# Patient Record
Sex: FEMALE | Race: BLACK OR AFRICAN AMERICAN | URBAN - METROPOLITAN AREA
[De-identification: names, ages, dates, MRNs, and addresses within clinical notes are randomized per-mention and may not be internally consistent; named-entity substitution may affect disease eponyms.]

---

## 2020-08-25 ENCOUNTER — PATIENT MESSAGE (OUTPATIENT)
Dept: PRIMARY CARE CLINIC | Age: 45
End: 2020-08-25

## 2020-08-26 ENCOUNTER — TELEPHONE (OUTPATIENT)
Dept: PRIMARY CARE CLINIC | Age: 45
End: 2020-08-26

## 2020-08-26 NOTE — TELEPHONE ENCOUNTER
----- Message from [facility]. Shahnaz sent at 8/26/2020  9:55 AM EDT -----  Regarding: RE: Non-Urgent Medical Question  Contact: [phone]  Thank you    ----- Message -----  From: Jerry Scott CMA  Sent: 8/26/20 8:54 AM  To: [facility] Shahnaz  Subject: RE: Non-Urgent Medical Question    Good Morning Martin Feast,   I will foreword this message to [facility] nurse. She is on maternity leave. 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 In addition several staff members have as well. I'm very concerned because my immune system tends to be low and plus I'm a diabetic. Currently the only way for me to stay home is with doctors permission. I would like your expertise as to what I should do.

## 2020-08-26 NOTE — TELEPHONE ENCOUNTER
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

## 2020-09-02 ENCOUNTER — TELEPHONE (OUTPATIENT)
Dept: PRIMARY CARE CLINIC | Age: 45
End: 2020-09-02

## 2020-09-02 ENCOUNTER — VIRTUAL VISIT (OUTPATIENT)
Dept: PRIMARY CARE CLINIC | Age: 45
End: 2020-09-02
Payer: COMMERCIAL

## 2020-09-02 DIAGNOSIS — E11.65 UNCONTROLLED TYPE 2 DIABETES MELLITUS WITH HYPERGLYCEMIA (HCC): Chronic | ICD-10-CM

## 2020-09-02 DIAGNOSIS — I10 ESSENTIAL HYPERTENSION: Primary | ICD-10-CM

## 2020-09-02 DIAGNOSIS — E66.9 OBESITY, CLASS II, BMI 35-39.9: ICD-10-CM

## 2020-09-02 DIAGNOSIS — T73.0XXA HUNGRY, INITIAL ENCOUNTER: Chronic | ICD-10-CM

## 2020-09-02 PROCEDURE — 99214 OFFICE O/P EST MOD 30 MIN: CPT | Performed by: FAMILY MEDICINE

## 2020-09-02 RX ORDER — FAMOTIDINE 20 MG/1
20 TABLET, FILM COATED ORAL 2 TIMES DAILY
Qty: 60 TAB | Refills: 1 | Status: SHIPPED | OUTPATIENT
Start: 2020-09-02 | End: 2021-03-17 | Stop reason: ALTCHOICE

## 2020-09-02 RX ORDER — NEBIVOLOL 20 MG/1
20 TABLET ORAL DAILY
Qty: 90 TAB | Refills: 1 | Status: SHIPPED | OUTPATIENT
Start: 2020-09-02 | End: 2021-01-03 | Stop reason: SDUPTHER

## 2020-09-02 RX ORDER — METFORMIN HYDROCHLORIDE 500 MG/1
1000 TABLET, EXTENDED RELEASE ORAL DAILY
COMMUNITY
End: 2020-09-02 | Stop reason: SDUPTHER

## 2020-09-02 RX ORDER — NEBIVOLOL 20 MG/1
20 TABLET ORAL DAILY
COMMUNITY
End: 2020-09-02 | Stop reason: SDUPTHER

## 2020-09-02 RX ORDER — LINAGLIPTIN 5 MG/1
5 TABLET, FILM COATED ORAL DAILY
COMMUNITY
End: 2020-11-12

## 2020-09-02 RX ORDER — METFORMIN HYDROCHLORIDE 500 MG/1
1000 TABLET, EXTENDED RELEASE ORAL DAILY
Qty: 60 TAB | Refills: 1 | Status: SHIPPED | OUTPATIENT
Start: 2020-09-02 | End: 2020-09-21 | Stop reason: SDUPTHER

## 2020-09-02 NOTE — TELEPHONE ENCOUNTER
Dr. Shawn Lewis told me to call patient and sent up a nurse visit for patient to teach her how to use her glucometer.  Called patient at 10:36 9/2/2020 left a message; no answer

## 2020-09-02 NOTE — PROGRESS NOTES
HISTORY OF PRESENT ILLNESS  Diabetes:  DM follow up appt  FBS not checking bc she does not know how to use her machine. Last HbA1c was 8.0 in March 2020   PT is taking metformin 500 XR  BID and tradjenta 5 in PM>    never been to DM education    Hypertension:  PT taking bystolic 20 mg qd    Checks BP at home /82. Recurrent abdominal pain:  Upper abdomen. Not worse with fatty foods. Still has gallbladder  Hungry all the time it seems. every 2 hrs or so, occ nausea. pain in stomach with diarrhea  In the evening with metftomin and tradjenta dosing. Takes metformin alone in AM and it does not bother her.     glipizide had also had symptoms with it. No waterbrash. No hx of gastritis. No hx of gastroparesis. No family his of Pancreatic  Cancer or panceratitis but says her MGM and her Mom have bad GERD>      Jeni Baltazar is a 39 y.o. female. Past Medical History:   Diagnosis Date    Anemia     Anxiety     Hypertension     Polycystic ovaries      Social History     Tobacco Use    Smoking status: Never Smoker    Smokeless tobacco: Never Used   Substance Use Topics    Alcohol use: Not on file    Drug use: Not on file       Family History   Problem Relation Age of Onset    Diabetes Other     Elevated Lipids Other     Heart Attack Other     Hypertension Other     Thyroid Disease Other        Review of Systems   Constitutional: Negative. Negative for chills, diaphoresis, fever and malaise/fatigue. Eyes: Negative for blurred vision. Respiratory: Negative for cough. Cardiovascular: Negative for chest pain, palpitations and orthopnea. Gastrointestinal: Positive for abdominal pain, diarrhea and nausea. Negative for constipation, heartburn and vomiting. Musculoskeletal: Negative for myalgias. Neurological: Negative for dizziness, weakness and headaches. Physical Exam  Constitutional:       General: She is not in acute distress.      Appearance: Normal appearance. She is obese. HENT:      Head: Normocephalic and atraumatic. Eyes:      Extraocular Movements: Extraocular movements intact. Conjunctiva/sclera: Conjunctivae normal.   Pulmonary:      Effort: Pulmonary effort is normal. No respiratory distress. Abdominal:      Palpations: Abdomen is soft. Neurological:      General: No focal deficit present. Mental Status: She is alert and oriented to person, place, and time. Psychiatric:         Mood and Affect: Mood normal.         Behavior: Behavior normal.         Thought Content: Thought content normal.         Judgment: Judgment normal.           ASSESSMENT and PLAN  Diagnoses and all orders for this visit:    1. Essential hypertension  Comments:  at goal.   Orders:  -     METABOLIC PANEL, COMPREHENSIVE  -     LIPID PANEL  -     CBC WITH AUTOMATED DIFF    2. Uncontrolled type 2 diabetes mellitus with hyperglycemia (HCC)  Comments:   Chart FBS daily. Come in for nurse educ of machine. take metformin qd X 3 days, then BID X 3 days , then add trdjenta X 3 days. call with #s& symtoms. Orders:  -     METABOLIC PANEL, COMPREHENSIVE  -     LIPID PANEL  -     HEMOGLOBIN A1C WITH EAG  -     AMYLASE  -     LIPASE  -     REFERRAL TO DIABETIC EDUCATION; Standing    3. Obesity, Class II, BMI 35-39.9  -     HEMOGLOBIN A1C WITH EAG  -     CBC WITH AUTOMATED DIFF  -     AMYLASE  -     LIPASE  -     REFERRAL TO DIABETIC EDUCATION; Standing    4. Hungry, initial encounter  Comments:  sounds like heartburn. add pepcid. If pain worses on pepcid change to prilosec. if heartburn gets worse  - continue - its working  and you are now more aware  Orders:  -     METABOLIC PANEL, COMPREHENSIVE  -     CBC WITH AUTOMATED DIFF  -     AMYLASE  -     LIPASE    Other orders  -     metFORMIN ER (GLUCOPHAGE XR) 500 mg tablet; Take 2 Tabs by mouth daily. Indications: type 2 diabetes mellitus  -     nebivoloL (Bystolic) 20 mg tablet; Take 1 Tab by mouth daily.  Indications: high blood pressure  -     famotidine (PEPCID) 20 mg tablet; Take 1 Tab by mouth two (2) times a day. Indications: heartburn         Orders Placed This Encounter    METABOLIC PANEL, COMPREHENSIVE    LIPID PANEL    HEMOGLOBIN A1C WITH EAG    CBC WITH AUTOMATED DIFF    AMYLASE    LIPASE    REFERRAL TO DIABETES EDUCATION - [facility]    DISCONTD: nebivoloL (Bystolic) 20 mg tablet    DISCONTD: metFORMIN ER (GLUCOPHAGE XR) 500 mg tablet    linaGLIPtin (Tradjenta) 5 mg tablet    metFORMIN ER (GLUCOPHAGE XR) 500 mg tablet    nebivoloL (Bystolic) 20 mg tablet    famotidine (PEPCID) 20 mg tablet     Follow-up and Dispositions    · Return in about 4 weeks (around 9/30/2020) for Medciation change follow up, phone follow up in 10 days.

## 2020-09-12 LAB
ALBUMIN SERPL-MCNC: 4.4 G/DL (ref 3.8–4.8)
ALBUMIN/GLOB SERPL: 1.5 {RATIO} (ref 1.2–2.2)
ALP SERPL-CCNC: 91 IU/L (ref 39–117)
ALT SERPL-CCNC: 14 IU/L (ref 0–32)
AMYLASE SERPL-CCNC: 63 U/L (ref 31–110)
AST SERPL-CCNC: 12 IU/L (ref 0–40)
BASOPHILS # BLD AUTO: 0 X10E3/UL (ref 0–0.2)
BASOPHILS NFR BLD AUTO: 0 %
BILIRUB SERPL-MCNC: 0.4 MG/DL (ref 0–1.2)
BUN SERPL-MCNC: 13 MG/DL (ref 6–24)
BUN/CREAT SERPL: 20 (ref 9–23)
CALCIUM SERPL-MCNC: 10.1 MG/DL (ref 8.7–10.2)
CHLORIDE SERPL-SCNC: 101 MMOL/L (ref 96–106)
CHOLEST SERPL-MCNC: 205 MG/DL (ref 100–199)
CO2 SERPL-SCNC: 26 MMOL/L (ref 20–29)
CREAT SERPL-MCNC: 0.66 MG/DL (ref 0.57–1)
EOSINOPHIL # BLD AUTO: 0.1 X10E3/UL (ref 0–0.4)
EOSINOPHIL NFR BLD AUTO: 1 %
ERYTHROCYTE [DISTWIDTH] IN BLOOD BY AUTOMATED COUNT: 11.8 % (ref 11.7–15.4)
EST. AVERAGE GLUCOSE BLD GHB EST-MCNC: 163 MG/DL
GLOBULIN SER CALC-MCNC: 2.9 G/DL (ref 1.5–4.5)
GLUCOSE SERPL-MCNC: 145 MG/DL (ref 65–99)
HBA1C MFR BLD: 7.3 % (ref 4.8–5.6)
HCT VFR BLD AUTO: 38.2 % (ref 34–46.6)
HDLC SERPL-MCNC: 37 MG/DL
HGB BLD-MCNC: 12.5 G/DL (ref 11.1–15.9)
IMM GRANULOCYTES # BLD AUTO: 0 X10E3/UL (ref 0–0.1)
IMM GRANULOCYTES NFR BLD AUTO: 0 %
LDLC SERPL CALC-MCNC: 148 MG/DL (ref 0–99)
LIPASE SERPL-CCNC: 48 U/L (ref 14–72)
LYMPHOCYTES # BLD AUTO: 3.9 X10E3/UL (ref 0.7–3.1)
LYMPHOCYTES NFR BLD AUTO: 47 %
MCH RBC QN AUTO: 30.3 PG (ref 26.6–33)
MCHC RBC AUTO-ENTMCNC: 32.7 G/DL (ref 31.5–35.7)
MCV RBC AUTO: 93 FL (ref 79–97)
MONOCYTES # BLD AUTO: 0.5 X10E3/UL (ref 0.1–0.9)
MONOCYTES NFR BLD AUTO: 6 %
NEUTROPHILS # BLD AUTO: 3.8 X10E3/UL (ref 1.4–7)
NEUTROPHILS NFR BLD AUTO: 46 %
PLATELET # BLD AUTO: 263 X10E3/UL (ref 150–450)
POTASSIUM SERPL-SCNC: 4.1 MMOL/L (ref 3.5–5.2)
PROT SERPL-MCNC: 7.3 G/DL (ref 6–8.5)
RBC # BLD AUTO: 4.13 X10E6/UL (ref 3.77–5.28)
SODIUM SERPL-SCNC: 141 MMOL/L (ref 134–144)
TRIGL SERPL-MCNC: 111 MG/DL (ref 0–149)
VLDLC SERPL CALC-MCNC: 20 MG/DL (ref 5–40)
WBC # BLD AUTO: 8.3 X10E3/UL (ref 3.4–10.8)

## 2020-09-18 NOTE — PROGRESS NOTES
Normal  blood count, normal liver and kidney function and normal blood sugar. A1c 7.3 goal under 7. LDL  148 goal under 70.    I would like to start a low dose chol pill eg crestor 5 mg

## 2020-09-21 ENCOUNTER — TELEPHONE (OUTPATIENT)
Dept: PRIMARY CARE CLINIC | Age: 45
End: 2020-09-21

## 2020-09-21 DIAGNOSIS — E11.9 TYPE 2 DIABETES MELLITUS WITHOUT COMPLICATION, UNSPECIFIED WHETHER LONG TERM INSULIN USE (HCC): Primary | ICD-10-CM

## 2020-09-21 NOTE — TELEPHONE ENCOUNTER
I called pt and LVM for her about her labs and ask will she willing to take some cholesterol meds.  SEBLE

## 2020-09-22 ENCOUNTER — TELEPHONE (OUTPATIENT)
Dept: PRIMARY CARE CLINIC | Age: 45
End: 2020-09-22

## 2020-09-22 DIAGNOSIS — E78.5 HYPERLIPIDEMIA, UNSPECIFIED HYPERLIPIDEMIA TYPE: Primary | ICD-10-CM

## 2020-09-22 RX ORDER — METFORMIN HYDROCHLORIDE 500 MG/1
1000 TABLET, EXTENDED RELEASE ORAL 2 TIMES DAILY
Qty: 120 TAB | Refills: 1 | Status: SHIPPED | OUTPATIENT
Start: 2020-09-22 | End: 2021-01-06 | Stop reason: SDUPTHER

## 2020-09-22 RX ORDER — ROSUVASTATIN CALCIUM 5 MG/1
5 TABLET, COATED ORAL
Qty: 30 TAB | Refills: 0 | Status: SHIPPED | OUTPATIENT
Start: 2020-09-22 | End: 2020-10-25

## 2020-09-22 NOTE — TELEPHONE ENCOUNTER
*-*-*This message has not been handled. *-*-*    Thanks! Is it possible that the Metformin can be increased since that isn't bothering me? Originally Diane Zaman wanted me on 1000 mg 2x a day. That was when I was first diagnosed in Dec 2019. She was starting me on a lower dose in the beginning.

## 2020-09-22 NOTE — TELEPHONE ENCOUNTER
I'm not able to take the tradjenta. It does cause my stomach to hurt. Is there something else I can take with the metformin?     I did receive your message about crestor for my cholesterol.

## 2020-09-22 NOTE — TELEPHONE ENCOUNTER
Pt called and said your message back to her didn't answer her question and she said for you to call her [phone]

## 2020-09-22 NOTE — TELEPHONE ENCOUNTER
Patient called today and stated that she has sent 3 different messages and she has followed the instructions that she was given by dr. Ming Castellon to take the metformin and tradjenta 5 mj at different times and the tradjenta is what is making her stomach hurt and she was supposed to have crestor called in for her as well & that has not been done as of yet either.

## 2020-09-22 NOTE — TELEPHONE ENCOUNTER
I have sent several messages to her that it is over 130. During my virtual appointment I told her I'm not able to mix metformin and tradjenta. My previous doctor (Kemi Lama) had me doing that and it made me very sick.

## 2020-09-22 NOTE — TELEPHONE ENCOUNTER
Dr. Sloan Yap, states to take half of the tradjenta with metformin. If you stomach continues to hurt, stop the tradjenta. Check your BS in the morning and if it continuously over 130, let us know. These mychart messages don't go to a particular staff bucket, sorry for the delay response. Make sure you set up you follow up appt for next month.  Crestor sent

## 2020-10-23 DIAGNOSIS — E78.5 HYPERLIPIDEMIA, UNSPECIFIED HYPERLIPIDEMIA TYPE: ICD-10-CM

## 2020-10-25 RX ORDER — ROSUVASTATIN CALCIUM 5 MG/1
TABLET, COATED ORAL
Qty: 30 TAB | Refills: 0 | Status: SHIPPED | OUTPATIENT
Start: 2020-10-25 | End: 2020-11-12 | Stop reason: SDUPTHER

## 2020-10-26 ENCOUNTER — TELEPHONE (OUTPATIENT)
Dept: PRIMARY CARE CLINIC | Age: 45
End: 2020-10-26

## 2020-10-26 RX ORDER — ROSUVASTATIN CALCIUM 5 MG/1
5 TABLET, COATED ORAL
Qty: 30 TAB | Refills: 0 | Status: SHIPPED | OUTPATIENT
Start: 2020-10-26 | End: 2020-11-12

## 2020-10-26 NOTE — TELEPHONE ENCOUNTER
Called the # in the system and a man said it was the wrong # but the patient already has an appt for the 28 th with jonathan.

## 2020-11-04 NOTE — TELEPHONE ENCOUNTER
BRANDI Manzano Pt did not show on 10/5/20 for appt for diabetes ed and has not responded to calls to rescedule    She has appt with you tommorrow

## 2020-11-12 ENCOUNTER — VIRTUAL VISIT (OUTPATIENT)
Dept: PRIMARY CARE CLINIC | Age: 45
End: 2020-11-12
Payer: COMMERCIAL

## 2020-11-12 DIAGNOSIS — I10 ESSENTIAL HYPERTENSION: Primary | ICD-10-CM

## 2020-11-12 DIAGNOSIS — R19.7 DIARRHEA, UNSPECIFIED TYPE: ICD-10-CM

## 2020-11-12 DIAGNOSIS — E78.5 HYPERLIPIDEMIA, UNSPECIFIED HYPERLIPIDEMIA TYPE: ICD-10-CM

## 2020-11-12 DIAGNOSIS — E11.65 UNCONTROLLED TYPE 2 DIABETES MELLITUS WITH HYPERGLYCEMIA (HCC): ICD-10-CM

## 2020-11-12 PROBLEM — E78.2 MIXED HYPERLIPIDEMIA: Status: ACTIVE | Noted: 2020-11-12

## 2020-11-12 PROCEDURE — 99214 OFFICE O/P EST MOD 30 MIN: CPT | Performed by: NURSE PRACTITIONER

## 2020-11-12 RX ORDER — ROSUVASTATIN CALCIUM 5 MG/1
TABLET, COATED ORAL
Qty: 90 TAB | Refills: 0 | Status: SHIPPED | OUTPATIENT
Start: 2020-11-12 | End: 2021-03-17 | Stop reason: SDUPTHER

## 2020-11-12 NOTE — LETTER
NOTIFICATION RETURN TO WORK / SCHOOL 
 
11/12/2020 10:59 AM 
 
Ms. Vasquez Christie [facility] [address] [facility] [id number] To Whom It May Concern: 
 
Justin Brooks is currently under the care of Jovana Dallas. Please excuse Ms. Christie from work starting on 11/10/2020. She will return to work/school on: when covid test results in next 1-5 days as long as the covid test is negative. If there are questions or concerns please have the patient contact our office. Sincerely, Sudhir Desai NP

## 2020-11-12 NOTE — ASSESSMENT & PLAN NOTE
GI issues with DPP4 and sulfonylureas class of medication. Start on farxiga   Continue on metformin 1500 mg a day. Return in 3 months for a1c check. Start monitoring fasting BG at home. Key Antihyperglycemic Medications             dapagliflozin (Farxiga) 5 mg tab tablet (Taking) Take 1 Tab by mouth daily. metFORMIN ER (GLUCOPHAGE XR) 500 mg tablet (Taking) Take 2 Tabs by mouth two (2) times a day.  Indications: type 2 diabetes mellitus        Other Key Diabetic Medications             rosuvastatin (CRESTOR) 5 mg tablet (Taking) Take 1 tablet by mouth nightly        Lab Results   Component Value Date/Time    Hemoglobin A1c 7.3 09/11/2020 02:29 PM    Glucose 145 09/11/2020 02:29 PM    Creatinine 0.66 09/11/2020 02:29 PM    Cholesterol, total 205 09/11/2020 02:29 PM    HDL Cholesterol 37 09/11/2020 02:29 PM    LDL Chol Calc (NIH) 148 09/11/2020 02:29 PM    Triglyceride 111 09/11/2020 02:29 PM     Diabetic Foot and Eye Exam HM Status   Topic Date Due    Diabetic Foot Care  06/29/1987    Eye Exam  06/29/1987

## 2020-11-12 NOTE — PROGRESS NOTES
HISTORY OF PRESENT ILLNESS  Leslie Christie is a 39 y.o. female presents via telemedicine for diabetes follow up. 1. Diabetes:  Patients last a1c was 7.3% in September. Patient reported that she was having diarrhea with tradjenta and this was stopped by DR. LEAL in September. Patient is only able to tolerate 3 metformin tablets a day so she takes 1 in the am and 2 in the PM.    Patient reported she hasn't been checking her BG at home recently. When she was checking it in September on just the metformin, her BG was 160's in the AM fasting. Also had stomach issues with glipizide. 2. Mixed hyperlipidemia:  Patient recently started on crestor for her elevated cholesterol. Denies any complications from the crestor. 3. Hypertension:  Patients BP is well controlled on bystolic. Patient denies headahces, dizziness or blurred vision. Patient had fever with diarrhea over the past few days. Got a covid test yesterday and is waiting for results. Not able to return to work until covid test comes back. Needs note for work. There were no vitals filed for this visit. Patient Active Problem List   Diagnosis Code    Anemia D64.9    Anxiety F41.9    Hypertension I10    Polycystic ovaries E28.2    Obesity, Class II, BMI 35-39.9 E66.9    Uncontrolled type 2 diabetes mellitus with hyperglycemia ([facility]) E11.65    Feeding [address]. 0XXA    Hyperlipidemia E78.5     Patient Active Problem List    Diagnosis Date Noted    Hyperlipidemia 11/12/2020    Obesity, Class II, BMI 35-39.9 09/02/2020    Uncontrolled type 2 diabetes mellitus with hyperglycemia ([facility] [address] 09/02/2020    Feeding [address] 53/75/6681    Anemia     Anxiety     Hypertension     Polycystic ovaries      Current Outpatient Medications   Medication Sig Dispense Refill    dapagliflozin (Farxiga) 5 mg tab tablet Take 1 Tab by mouth daily.  90 Tab 0    rosuvastatin (CRESTOR) 5 mg tablet Take 1 tablet by mouth nightly 90 Tab 0    metFORMIN ER (GLUCOPHAGE XR) 500 mg tablet Take 2 Tabs by mouth two (2) times a day. Indications: type 2 diabetes mellitus 120 Tab 1    nebivoloL (Bystolic) 20 mg tablet Take 1 Tab by mouth daily. Indications: high blood pressure 90 Tab 1    famotidine (PEPCID) 20 mg tablet Take 1 Tab by mouth two (2) times a day. Indications: heartburn 60 Tab 1     Allergies   Allergen Reactions    Tradjenta [Linagliptin] Other (comments)     Upset stomach      Past Medical History:   Diagnosis Date    Anemia     Anxiety     Diabetes ([address]     Hypertension     Polycystic ovaries      History reviewed. No pertinent surgical history. Family History   Problem Relation Age of Onset    Diabetes Other     Elevated Lipids Other     Heart Attack Other     Thyroid Disease Other     Hypertension Mother     Hypertension Father      Social History     Tobacco Use    Smoking status: Never Smoker    Smokeless tobacco: Never Used   Substance Use Topics    Alcohol use: Never     Frequency: Never           Review of Systems   Constitutional: Negative for chills and fever. Eyes: Negative for blurred vision. Respiratory: Negative for shortness of breath. Cardiovascular: Negative for chest pain and palpitations. Gastrointestinal: Negative for diarrhea, nausea and vomiting. Musculoskeletal: Negative for joint pain and myalgias. Neurological: Negative for dizziness and headaches. Physical Exam  Constitutional:       Appearance: She is obese. HENT:      Head: Normocephalic. Eyes:      Conjunctiva/sclera: Conjunctivae normal.      Pupils: Pupils are equal, round, and reactive to light. Pulmonary:      Effort: Pulmonary effort is normal.   Skin:     General: Skin is warm and dry. Neurological:      Mental Status: She is alert. ASSESSMENT and PLAN  Diagnoses and all orders for this visit:    1. Essential hypertension  Assessment & Plan:  Well controlled on bystolic.        2. Uncontrolled type 2 diabetes mellitus with hyperglycemia ([facility] [address]  Assessment & Plan:  GI issues with DPP4 and sulfonylureas class of medication. Start on farxiga   Continue on metformin 1500 mg a day. Return in 3 months for a1c check. Start monitoring fasting BG at home. Key Antihyperglycemic Medications             dapagliflozin (Farxiga) 5 mg tab tablet (Taking) Take 1 Tab by mouth daily. metFORMIN ER (GLUCOPHAGE XR) 500 mg tablet (Taking) Take 2 Tabs by mouth two (2) times a day. Indications: type 2 diabetes mellitus        Other Key Diabetic Medications             rosuvastatin (CRESTOR) 5 mg tablet (Taking) Take 1 tablet by mouth nightly        Lab Results   Component Value Date/Time    Hemoglobin A1c 7.3 09/11/2020 02:29 PM    Glucose 145 09/11/2020 02:29 PM    Creatinine 0.66 09/11/2020 02:29 PM    Cholesterol, total 205 09/11/2020 02:29 PM    HDL Cholesterol 37 09/11/2020 02:29 PM    LDL Chol Calc (NIH) 148 09/11/2020 02:29 PM    Triglyceride 111 09/11/2020 02:29 PM     Diabetic Foot and Eye Exam HM Status   Topic Date Due    Diabetic Foot Care  06/29/1987    Eye Exam  06/29/1987       Orders:  -     dapagliflozin (Farxiga) 5 mg tab tablet; Take 1 Tab by mouth daily. 3. Diarrhea, unspecified type  Comments:  Resolved at this time, awaiting covid test results. Provided work note for patient today. 4. Hyperlipidemia, unspecified hyperlipidemia type  Assessment & Plan:  Continue on crestor. Recheck lipid panel in 3 months. Orders:  -     rosuvastatin (CRESTOR) 5 mg tablet; Take 1 tablet by mouth nightly       Vasquez Christie, who was evaluated through a synchronous (real-time) audio-video encounter, and/or her healthcare decision maker, is aware that it is a billable service, with coverage as determined by her insurance carrier. She provided verbal consent to proceed: Yes, and patient identification was verified.  It was conducted pursuant to the emergency declaration under the [address] Emergencies Act, [facility] authority and the Coronavirus Preparedness and Response Supplemental Appropriations Act. A caregiver was present when appropriate. Ability to conduct physical exam was limited. I was at home. The patient was at home.         Evita Coko NP

## 2021-01-06 ENCOUNTER — PATIENT MESSAGE (OUTPATIENT)
Dept: PRIMARY CARE CLINIC | Age: 46
End: 2021-01-06

## 2021-01-06 DIAGNOSIS — E11.9 TYPE 2 DIABETES MELLITUS WITHOUT COMPLICATION, UNSPECIFIED WHETHER LONG TERM INSULIN USE (HCC): ICD-10-CM

## 2021-01-06 RX ORDER — METFORMIN HYDROCHLORIDE 500 MG/1
1500 TABLET, EXTENDED RELEASE ORAL
Qty: 270 TAB | Refills: 0 | Status: SHIPPED | OUTPATIENT
Start: 2021-01-06 | End: 2021-03-17 | Stop reason: SDUPTHER

## 2021-01-06 NOTE — TELEPHONE ENCOUNTER
From: Joanie Christie  To: Rigoberto Conley NP  Sent: 1/6/2021 8:22 AM EST  Subject: Prescription Question    Dr. Viola Ballard,  Recently I had Metformin refilled at the pharmacy. It was an old metformin prescription from when I was taking 2 per day. Currently I'm suppose to take 3 per day (prescription written for 4 x per day but Dr. Nisreen Mcdowell allowed me to reduce it since it was making me a little sick). Therefore I'm currently taking 3 per day. The prescription that I just had refilled yesterday isn't going to be enough since I am taking 3 per day. How can this be adjusted? I need enough for 3 per day. I hope you understand what I'm trying to say.

## 2021-02-22 DIAGNOSIS — E11.65 UNCONTROLLED TYPE 2 DIABETES MELLITUS WITH HYPERGLYCEMIA (HCC): ICD-10-CM

## 2021-02-23 RX ORDER — DAPAGLIFLOZIN 5 MG/1
TABLET, FILM COATED ORAL
Qty: 90 TAB | Refills: 0 | Status: SHIPPED | OUTPATIENT
Start: 2021-02-23 | End: 2021-03-17 | Stop reason: SDUPTHER

## 2021-03-17 ENCOUNTER — VIRTUAL VISIT (OUTPATIENT)
Dept: PRIMARY CARE CLINIC | Age: 46
End: 2021-03-17
Payer: COMMERCIAL

## 2021-03-17 DIAGNOSIS — E78.5 HYPERLIPIDEMIA, UNSPECIFIED HYPERLIPIDEMIA TYPE: Chronic | ICD-10-CM

## 2021-03-17 DIAGNOSIS — E55.9 VITAMIN D DEFICIENCY: ICD-10-CM

## 2021-03-17 DIAGNOSIS — I10 ESSENTIAL HYPERTENSION: Primary | ICD-10-CM

## 2021-03-17 DIAGNOSIS — E11.9 TYPE 2 DIABETES MELLITUS WITHOUT COMPLICATION, UNSPECIFIED WHETHER LONG TERM INSULIN USE (HCC): ICD-10-CM

## 2021-03-17 PROCEDURE — 99214 OFFICE O/P EST MOD 30 MIN: CPT | Performed by: NURSE PRACTITIONER

## 2021-03-17 RX ORDER — ROSUVASTATIN CALCIUM 5 MG/1
TABLET, COATED ORAL
Qty: 90 TAB | Refills: 1 | Status: SHIPPED | OUTPATIENT
Start: 2021-03-17 | End: 2021-09-30 | Stop reason: SDUPTHER

## 2021-03-17 RX ORDER — NEBIVOLOL 20 MG/1
20 TABLET ORAL DAILY
Qty: 90 TAB | Refills: 1 | Status: SHIPPED | OUTPATIENT
Start: 2021-03-17 | End: 2021-09-30 | Stop reason: SDUPTHER

## 2021-03-17 RX ORDER — METFORMIN HYDROCHLORIDE 500 MG/1
1500 TABLET, EXTENDED RELEASE ORAL
Qty: 270 TAB | Refills: 1 | Status: SHIPPED | OUTPATIENT
Start: 2021-03-17 | End: 2021-09-30 | Stop reason: SDUPTHER

## 2021-03-17 NOTE — PROGRESS NOTES
HISTORY OF PRESENT ILLNESS  Deborah Christie is a 39 y.o. female presents via telemedicine for chronic OV follow up. 1. Diabetes:  Patients last a1c was 7.3% in September. Patient reported that she was having diarrhea with tradjenta and this was stopped by DR. LEAL in September. Patient is only able to tolerate 3 metformin tablets a day so she takes 1 in the am and 2 in the PM.      Also had stomach issues with glipizide in past.   Started on farxiga x3 months ago and patient notes that this medication has been working well for her. 2. Mixed hyperlipidemia:  Patient recently started on crestor for her elevated cholesterol. Denies any complications from the crestor. 3. Hypertension:  Patients BP is well controlled on bystolic. Patient denies headahces, dizziness or blurred vision. There were no vitals filed for this visit. Patient Active Problem List   Diagnosis Code    Anemia D64.9    Anxiety F41.9    Hypertension I10    Polycystic ovaries E28.2    Obesity, Class II, BMI 35-39.9 E66.9    Feeding [address] 0XXA    Hyperlipidemia E78.5    Type 2 diabetes mellitus without complication ([facility] [address] E63.7    Vitamin D deficiency E55.9     Patient Active Problem List    Diagnosis Date Noted    Type 2 diabetes mellitus without complication ([facility] [address] 39/31/5659    Vitamin D deficiency 03/17/2021    Hyperlipidemia 11/12/2020    Obesity, Class II, BMI 35-39.9 09/02/2020    Feeding drive 52/25/4965    Anemia     Anxiety     Hypertension     Polycystic ovaries      Current Outpatient Medications   Medication Sig Dispense Refill    dapagliflozin (Farxiga) 5 mg tab tablet Take 1 Tab by mouth daily. 90 Tab 1    metFORMIN ER (GLUCOPHAGE XR) 500 mg tablet Take 3 Tabs by mouth daily (with dinner). Indications: type 2 diabetes mellitus 270 Tab 1    nebivoloL (Bystolic) 20 mg tablet Take 1 Tab by mouth daily.  Indications: high blood pressure 90 Tab 1    rosuvastatin (CRESTOR) 5 mg tablet Take 1 tablet by mouth nightly 90 Tab 1     Allergies   Allergen Reactions    Tradjenta [Linagliptin] Other (comments)     Upset stomach      Past Medical History:   Diagnosis Date    Anemia     Anxiety     Diabetes ([address]     Hypercholesterolemia     Hypertension     Polycystic ovaries      History reviewed. No pertinent surgical history. Family History   Problem Relation Age of Onset    Diabetes Other     Elevated Lipids Other     Heart Attack Other     Thyroid Disease Other     Hypertension Mother     Hypertension Father      Social History     Tobacco Use    Smoking status: Never Smoker    Smokeless tobacco: Never Used   Substance Use Topics    Alcohol use: Never     Frequency: Never           Review of Systems   Constitutional: Negative for chills and fever. Eyes: Negative for blurred vision. Respiratory: Negative for shortness of breath. Cardiovascular: Negative for chest pain and palpitations. Gastrointestinal: Negative for diarrhea, nausea and vomiting. Musculoskeletal: Negative for joint pain and myalgias. Neurological: Negative for dizziness and headaches. Physical Exam  Constitutional:       Appearance: She is obese. HENT:      Head: Normocephalic. Eyes:      Conjunctiva/sclera: Conjunctivae normal.      Pupils: Pupils are equal, round, and reactive to light. Pulmonary:      Effort: Pulmonary effort is normal.   Skin:     General: Skin is warm and dry. Neurological:      Mental Status: She is alert. ASSESSMENT and PLAN  Diagnoses and all orders for this visit:    1. Essential hypertension  Comments:  well controlled on bystolic. Orders:  -     nebivoloL (Bystolic) 20 mg tablet; Take 1 Tab by mouth daily. Indications: high blood pressure    2. Type 2 diabetes mellitus without complication, unspecified whether long term insulin use (HCC)  Comments:  sounds like better control on farxiga and metformin. She will come in for labs on Friday.    Orders:  - dapagliflozin (Farxiga) 5 mg tab tablet; Take 1 Tab by mouth daily. -     metFORMIN ER (GLUCOPHAGE XR) 500 mg tablet; Take 3 Tabs by mouth daily (with dinner). Indications: type 2 diabetes mellitus  -     HEMOGLOBIN A1C WITH EAG  -     CBC WITH AUTOMATED DIFF  -     METABOLIC PANEL, COMPREHENSIVE    3. Hyperlipidemia, unspecified hyperlipidemia type  Comments:  recheck labs since starting crestor. Orders:  -     rosuvastatin (CRESTOR) 5 mg tablet; Take 1 tablet by mouth nightly  -     LIPID PANEL    4. Vitamin D deficiency  Comments:  recheck vitamin D level. D3 2000 units caused leg cramps. Orders:  -     VITAMIN D, 25 HYDROXY         Sheona B Tiblandymorton, who was evaluated through a synchronous (real-time) audio-video encounter, and/or her healthcare decision maker, is aware that it is a billable service, with coverage as determined by her insurance carrier. She provided verbal consent to proceed: Yes, and patient identification was verified. It was conducted pursuant to the emergency declaration under the [address] [place] [address], [address] [place] [address] authority and the [place] and [employer] Act. A caregiver was present when appropriate. Ability to conduct physical exam was limited. I was at home.  The patient was in her car        Letty Cast NP

## 2021-03-20 LAB
25(OH)D3+25(OH)D2 SERPL-MCNC: 29.1 NG/ML (ref 30–100)
ALBUMIN SERPL-MCNC: 4.4 G/DL (ref 3.8–4.8)
ALBUMIN/GLOB SERPL: 1.5 {RATIO} (ref 1.2–2.2)
ALP SERPL-CCNC: 91 IU/L (ref 39–117)
ALT SERPL-CCNC: 19 IU/L (ref 0–32)
AST SERPL-CCNC: 15 IU/L (ref 0–40)
BASOPHILS # BLD AUTO: 0 X10E3/UL (ref 0–0.2)
BASOPHILS NFR BLD AUTO: 1 %
BILIRUB SERPL-MCNC: 0.6 MG/DL (ref 0–1.2)
BUN SERPL-MCNC: 10 MG/DL (ref 6–24)
BUN/CREAT SERPL: 14 (ref 9–23)
CALCIUM SERPL-MCNC: 9.7 MG/DL (ref 8.7–10.2)
CHLORIDE SERPL-SCNC: 101 MMOL/L (ref 96–106)
CHOLEST SERPL-MCNC: 127 MG/DL (ref 100–199)
CO2 SERPL-SCNC: 25 MMOL/L (ref 20–29)
CREAT SERPL-MCNC: 0.74 MG/DL (ref 0.57–1)
EOSINOPHIL # BLD AUTO: 0.1 X10E3/UL (ref 0–0.4)
EOSINOPHIL NFR BLD AUTO: 2 %
ERYTHROCYTE [DISTWIDTH] IN BLOOD BY AUTOMATED COUNT: 12 % (ref 11.7–15.4)
EST. AVERAGE GLUCOSE BLD GHB EST-MCNC: 146 MG/DL
GLOBULIN SER CALC-MCNC: 2.9 G/DL (ref 1.5–4.5)
GLUCOSE SERPL-MCNC: 140 MG/DL (ref 65–99)
HBA1C MFR BLD: 6.7 % (ref 4.8–5.6)
HCT VFR BLD AUTO: 41.6 % (ref 34–46.6)
HDLC SERPL-MCNC: 38 MG/DL
HGB BLD-MCNC: 14 G/DL (ref 11.1–15.9)
IMM GRANULOCYTES # BLD AUTO: 0 X10E3/UL (ref 0–0.1)
IMM GRANULOCYTES NFR BLD AUTO: 0 %
LDLC SERPL CALC-MCNC: 73 MG/DL (ref 0–99)
LYMPHOCYTES # BLD AUTO: 2.8 X10E3/UL (ref 0.7–3.1)
LYMPHOCYTES NFR BLD AUTO: 45 %
MCH RBC QN AUTO: 31 PG (ref 26.6–33)
MCHC RBC AUTO-ENTMCNC: 33.7 G/DL (ref 31.5–35.7)
MCV RBC AUTO: 92 FL (ref 79–97)
MONOCYTES # BLD AUTO: 0.3 X10E3/UL (ref 0.1–0.9)
MONOCYTES NFR BLD AUTO: 5 %
NEUTROPHILS # BLD AUTO: 3 X10E3/UL (ref 1.4–7)
NEUTROPHILS NFR BLD AUTO: 47 %
PLATELET # BLD AUTO: 267 X10E3/UL (ref 150–450)
POTASSIUM SERPL-SCNC: 4.2 MMOL/L (ref 3.5–5.2)
PROT SERPL-MCNC: 7.3 G/DL (ref 6–8.5)
RBC # BLD AUTO: 4.52 X10E6/UL (ref 3.77–5.28)
SODIUM SERPL-SCNC: 141 MMOL/L (ref 134–144)
TRIGL SERPL-MCNC: 81 MG/DL (ref 0–149)
VLDLC SERPL CALC-MCNC: 16 MG/DL (ref 5–40)
WBC # BLD AUTO: 6.3 X10E3/UL (ref 3.4–10.8)

## 2021-03-22 ENCOUNTER — PATIENT MESSAGE (OUTPATIENT)
Dept: PRIMARY CARE CLINIC | Age: 46
End: 2021-03-22

## 2021-03-22 DIAGNOSIS — E55.9 VITAMIN D DEFICIENCY: Primary | ICD-10-CM

## 2021-03-22 RX ORDER — MELATONIN
1000 DAILY
Qty: 90 TAB | Refills: 1 | Status: SHIPPED | OUTPATIENT
Start: 2021-03-22 | End: 2021-09-30

## 2021-09-30 ENCOUNTER — VIRTUAL VISIT (OUTPATIENT)
Dept: PRIMARY CARE CLINIC | Age: 46
End: 2021-09-30
Payer: COMMERCIAL

## 2021-09-30 DIAGNOSIS — E11.9 TYPE 2 DIABETES MELLITUS WITHOUT COMPLICATION, UNSPECIFIED WHETHER LONG TERM INSULIN USE (HCC): Chronic | ICD-10-CM

## 2021-09-30 DIAGNOSIS — I10 ESSENTIAL HYPERTENSION: ICD-10-CM

## 2021-09-30 DIAGNOSIS — E78.5 HYPERLIPIDEMIA, UNSPECIFIED HYPERLIPIDEMIA TYPE: Chronic | ICD-10-CM

## 2021-09-30 PROCEDURE — 99214 OFFICE O/P EST MOD 30 MIN: CPT | Performed by: NURSE PRACTITIONER

## 2021-09-30 RX ORDER — METFORMIN HYDROCHLORIDE 500 MG/1
1500 TABLET, EXTENDED RELEASE ORAL
Qty: 270 TABLET | Refills: 1 | Status: SHIPPED | OUTPATIENT
Start: 2021-09-30 | End: 2022-03-29 | Stop reason: SDUPTHER

## 2021-09-30 RX ORDER — ROSUVASTATIN CALCIUM 5 MG/1
TABLET, COATED ORAL
Qty: 90 TABLET | Refills: 1 | Status: SHIPPED | OUTPATIENT
Start: 2021-09-30 | End: 2022-03-29 | Stop reason: SDUPTHER

## 2021-09-30 RX ORDER — NEBIVOLOL 20 MG/1
20 TABLET ORAL DAILY
Qty: 90 TABLET | Refills: 1 | Status: SHIPPED | OUTPATIENT
Start: 2021-09-30 | End: 2022-03-29 | Stop reason: SDUPTHER

## 2021-09-30 NOTE — PROGRESS NOTES
HISTORY OF PRESENT ILLNESS  Dionicio Christie is a 42 y.o. female presents via telemedicine for chronic OV follow up. 1. Diabetes:  Patients last a1c was 6.7%. Patient's diabetes is well controlled on metformin and farxiga. Patient reported that she was having diarrhea with tradjenta and this was stopped by DR. LEAL in September. Patient is only able to tolerate 3 metformin tablets a day so she takes 1 in the am and 2 in the PM.      Also had stomach issues with glipizide in past.   Last eye exam was 2 years ago. 2. Mixed hyperlipidemia:  Patient recently started on crestor for her elevated cholesterol. Denies any complications from the crestor. 3. Hypertension:  Patients BP is well controlled on bystolic. Patient denies headahces, dizziness or blurred vision. Checks BP at work. There were no vitals filed for this visit. Patient Active Problem List   Diagnosis Code    Anemia D64.9    Anxiety F41.9    Hypertension I10    Polycystic ovaries E28.2    Obesity, Class II, BMI 35-39.9 E66.9    Feeding drive X17. 0XXA    Hyperlipidemia E78.5    Type 2 diabetes mellitus without complication ([facility] O17.2    Vitamin D deficiency E55.9     Patient Active Problem List    Diagnosis Date Noted    Type 2 diabetes mellitus without complication ([facility] 78/77/3864    Vitamin D deficiency 03/17/2021    Hyperlipidemia 11/12/2020    Obesity, Class II, BMI 35-39.9 09/02/2020    Feeding drive 77/05/0778    Anemia     Anxiety     Hypertension     Polycystic ovaries      Current Outpatient Medications   Medication Sig Dispense Refill    dapagliflozin (Farxiga) 5 mg tab tablet Take 1 Tablet by mouth daily. 90 Tablet 1    metFORMIN ER (GLUCOPHAGE XR) 500 mg tablet Take 3 Tablets by mouth daily (with dinner). Indications: type 2 diabetes mellitus 270 Tablet 1    nebivoloL (Bystolic) 20 mg tablet Take 1 Tablet by mouth daily.  Indications: high blood pressure 90 Tablet 1    rosuvastatin (CRESTOR) 5 mg tablet Take 1 tablet by mouth nightly 90 Tablet 1     Allergies   Allergen Reactions    Tradjenta [Linagliptin] Other (comments)     Upset stomach      Past Medical History:   Diagnosis Date    Anemia     Anxiety     Diabetes ([facility]     Hypercholesterolemia     Hypertension     Polycystic ovaries      History reviewed. No pertinent surgical history. Family History   Problem Relation Age of Onset    Diabetes Other     Elevated Lipids Other     Heart Attack Other     Thyroid Disease Other     Hypertension Mother     Hypertension Father      Social History     Tobacco Use    Smoking status: Never Smoker    Smokeless tobacco: Never Used   Substance Use Topics    Alcohol use: Never           Review of Systems   Constitutional: Negative for chills and fever. Eyes: Negative for blurred vision. Respiratory: Negative for shortness of breath. Cardiovascular: Negative for chest pain and palpitations. Gastrointestinal: Negative for diarrhea, nausea and vomiting. Musculoskeletal: Negative for joint pain and myalgias. Neurological: Negative for dizziness and headaches. Physical Exam  Constitutional:       Appearance: She is obese. HENT:      Head: Normocephalic. Eyes:      Conjunctiva/sclera: Conjunctivae normal.      Pupils: Pupils are equal, round, and reactive to light. Pulmonary:      Effort: Pulmonary effort is normal.   Skin:     General: Skin is warm and dry. Neurological:      Mental Status: She is alert. ASSESSMENT and PLAN  Diagnoses and all orders for this visit:    1. Type 2 diabetes mellitus without complication, unspecified whether long term insulin use ([facility]  Comments:  well controlled. Orders:  -     dapagliflozin (Farxiga) 5 mg tab tablet; Take 1 Tablet by mouth daily. -     metFORMIN ER (GLUCOPHAGE XR) 500 mg tablet; Take 3 Tablets by mouth daily (with dinner).  Indications: type 2 diabetes mellitus  -     METABOLIC PANEL, COMPREHENSIVE  -     LIPID PANEL  -     CBC WITH AUTOMATED DIFF  -     HEMOGLOBIN A1C WITH EAG  -     MICROALBUMIN, UR, RAND W/ MICROALB/CREAT RATIO    2. Essential hypertension  Comments:  well controlled on bystolic. Orders:  -     nebivoloL (Bystolic) 20 mg tablet; Take 1 Tablet by mouth daily. Indications: high blood pressure  -     METABOLIC PANEL, COMPREHENSIVE    3. Hyperlipidemia, unspecified hyperlipidemia type  Comments:  stable. Orders:  -     rosuvastatin (CRESTOR) 5 mg tablet; Take 1 tablet by mouth nightly  -     LIPID PANEL     would like to see patient in the office for next follow up visit as opposed to virtual.  She agrees. F/u in 6 month. Aggie Guerra, who was evaluated through a synchronous (real-time) audio-video encounter, and/or her healthcare decision maker, is aware that it is a billable service, with coverage as determined by her insurance carrier. She provided verbal consent to proceed: Yes, and patient identification was verified. It was conducted pursuant to the emergency declaration under the [address] [place] [address] authority and the [employer] General Act. A caregiver was present when appropriate. Ability to conduct physical exam was limited. I was at home.  The patient was in her car        Durga Medrano NP

## 2021-09-30 NOTE — PROGRESS NOTES
Chief Complaint   Patient presents with    Follow Up Chronic Condition     pt states she is following up diabetes and needs refills on all emds below, list reviewed      There were no vitals taken for this visit. 1. Have you been to the ER, urgent care clinic since your last visit? Hospitalized since your last visit? Yes urgent care for uti    2. Have you seen or consulted any other health care providers outside of the [address] [place] [address] since your last visit? Include any pap smears or colon screening.  No.

## 2021-10-13 LAB
ALBUMIN SERPL-MCNC: 4.5 G/DL (ref 3.8–4.8)
ALBUMIN/CREAT UR: 30 MG/G CREAT (ref 0–29)
ALBUMIN/GLOB SERPL: 1.7 {RATIO} (ref 1.2–2.2)
ALP SERPL-CCNC: 92 IU/L (ref 44–121)
ALT SERPL-CCNC: 23 IU/L (ref 0–32)
AST SERPL-CCNC: 16 IU/L (ref 0–40)
BASOPHILS # BLD AUTO: 0 X10E3/UL (ref 0–0.2)
BASOPHILS NFR BLD AUTO: 1 %
BILIRUB SERPL-MCNC: 0.4 MG/DL (ref 0–1.2)
BUN SERPL-MCNC: 15 MG/DL (ref 6–24)
BUN/CREAT SERPL: 25 (ref 9–23)
CALCIUM SERPL-MCNC: 9.8 MG/DL (ref 8.7–10.2)
CHLORIDE SERPL-SCNC: 101 MMOL/L (ref 96–106)
CHOLEST SERPL-MCNC: 181 MG/DL (ref 100–199)
CO2 SERPL-SCNC: 29 MMOL/L (ref 20–29)
CREAT SERPL-MCNC: 0.59 MG/DL (ref 0.57–1)
CREAT UR-MCNC: 52.8 MG/DL
EOSINOPHIL # BLD AUTO: 0.1 X10E3/UL (ref 0–0.4)
EOSINOPHIL NFR BLD AUTO: 1 %
ERYTHROCYTE [DISTWIDTH] IN BLOOD BY AUTOMATED COUNT: 11.5 % (ref 11.7–15.4)
EST. AVERAGE GLUCOSE BLD GHB EST-MCNC: 169 MG/DL
GLOBULIN SER CALC-MCNC: 2.6 G/DL (ref 1.5–4.5)
GLUCOSE SERPL-MCNC: 124 MG/DL (ref 65–99)
HBA1C MFR BLD: 7.5 % (ref 4.8–5.6)
HCT VFR BLD AUTO: 42.6 % (ref 34–46.6)
HDLC SERPL-MCNC: 46 MG/DL
HGB BLD-MCNC: 13.7 G/DL (ref 11.1–15.9)
IMM GRANULOCYTES # BLD AUTO: 0 X10E3/UL (ref 0–0.1)
IMM GRANULOCYTES NFR BLD AUTO: 0 %
LDLC SERPL CALC-MCNC: 115 MG/DL (ref 0–99)
LYMPHOCYTES # BLD AUTO: 5 X10E3/UL (ref 0.7–3.1)
LYMPHOCYTES NFR BLD AUTO: 56 %
MCH RBC QN AUTO: 30.3 PG (ref 26.6–33)
MCHC RBC AUTO-ENTMCNC: 32.2 G/DL (ref 31.5–35.7)
MCV RBC AUTO: 94 FL (ref 79–97)
MICROALBUMIN UR-MCNC: 16.1 UG/ML
MONOCYTES # BLD AUTO: 0.4 X10E3/UL (ref 0.1–0.9)
MONOCYTES NFR BLD AUTO: 5 %
NEUTROPHILS # BLD AUTO: 3.2 X10E3/UL (ref 1.4–7)
NEUTROPHILS NFR BLD AUTO: 37 %
PLATELET # BLD AUTO: 271 X10E3/UL (ref 150–450)
POTASSIUM SERPL-SCNC: 4.5 MMOL/L (ref 3.5–5.2)
PROT SERPL-MCNC: 7.1 G/DL (ref 6–8.5)
RBC # BLD AUTO: 4.52 X10E6/UL (ref 3.77–5.28)
SODIUM SERPL-SCNC: 142 MMOL/L (ref 134–144)
TRIGL SERPL-MCNC: 109 MG/DL (ref 0–149)
VLDLC SERPL CALC-MCNC: 20 MG/DL (ref 5–40)
WBC # BLD AUTO: 8.7 X10E3/UL (ref 3.4–10.8)

## 2022-03-19 PROBLEM — E78.5 HYPERLIPIDEMIA: Status: ACTIVE | Noted: 2020-11-12

## 2022-03-19 PROBLEM — E66.9 OBESITY, CLASS II, BMI 35-39.9: Status: ACTIVE | Noted: 2020-09-02

## 2022-03-19 PROBLEM — E11.9 TYPE 2 DIABETES MELLITUS WITHOUT COMPLICATION (HCC): Status: ACTIVE | Noted: 2021-03-17

## 2022-03-19 PROBLEM — E66.812 OBESITY, CLASS II, BMI 35-39.9: Status: ACTIVE | Noted: 2020-09-02

## 2022-03-19 PROBLEM — T73.0XXA FEEDING DRIVE: Status: ACTIVE | Noted: 2020-09-02

## 2022-03-20 PROBLEM — E55.9 VITAMIN D DEFICIENCY: Status: ACTIVE | Noted: 2021-03-17

## 2022-03-29 ENCOUNTER — OFFICE VISIT (OUTPATIENT)
Dept: PRIMARY CARE CLINIC | Age: 47
End: 2022-03-29
Payer: COMMERCIAL

## 2022-03-29 VITALS
BODY MASS INDEX: 37.76 KG/M2 | SYSTOLIC BLOOD PRESSURE: 170 MMHG | HEIGHT: 72 IN | DIASTOLIC BLOOD PRESSURE: 98 MMHG | RESPIRATION RATE: 18 BRPM | TEMPERATURE: 98.1 F | WEIGHT: 278.8 LBS

## 2022-03-29 DIAGNOSIS — E11.9 TYPE 2 DIABETES MELLITUS WITHOUT COMPLICATION, UNSPECIFIED WHETHER LONG TERM INSULIN USE (HCC): Chronic | ICD-10-CM

## 2022-03-29 DIAGNOSIS — E78.5 HYPERLIPIDEMIA, UNSPECIFIED HYPERLIPIDEMIA TYPE: Chronic | ICD-10-CM

## 2022-03-29 DIAGNOSIS — I10 ESSENTIAL HYPERTENSION: ICD-10-CM

## 2022-03-29 PROCEDURE — 99214 OFFICE O/P EST MOD 30 MIN: CPT | Performed by: NURSE PRACTITIONER

## 2022-03-29 RX ORDER — HYDROCHLOROTHIAZIDE 12.5 MG/1
12.5 TABLET ORAL DAILY
Qty: 30 TABLET | Refills: 0 | Status: SHIPPED | OUTPATIENT
Start: 2022-03-29 | End: 2022-04-15 | Stop reason: ALTCHOICE

## 2022-03-29 RX ORDER — NEBIVOLOL 20 MG/1
20 TABLET ORAL DAILY
Qty: 90 TABLET | Refills: 1 | Status: SHIPPED | OUTPATIENT
Start: 2022-03-29 | End: 2022-06-07 | Stop reason: SDUPTHER

## 2022-03-29 RX ORDER — ROSUVASTATIN CALCIUM 5 MG/1
TABLET, COATED ORAL
Qty: 90 TABLET | Refills: 1 | Status: SHIPPED | OUTPATIENT
Start: 2022-03-29 | End: 2022-08-09 | Stop reason: SDUPTHER

## 2022-03-29 RX ORDER — METFORMIN HYDROCHLORIDE 500 MG/1
1500 TABLET, EXTENDED RELEASE ORAL
Qty: 270 TABLET | Refills: 1 | Status: SHIPPED | OUTPATIENT
Start: 2022-03-29 | End: 2022-06-14 | Stop reason: SDUPTHER

## 2022-03-29 NOTE — PROGRESS NOTES
HISTORY OF PRESENT ILLNESS  Yenni Christie is a 42 y.o. female presents for medication refill. Diabetes: Last A1c was   Lab Results   Component Value Date/Time    Hemoglobin A1c 7.5 (H) 10/12/2021 03:20 PM    . Diabetes well controlled on metformin and farxiga. Patient is due for eye exam. Denies neuropathy. Hyperlipidemia: Mixed hyperlipidemia well controlled on crestor. Denies any leg cramps or malaise from this medication. Reported compliance with taking medication daily. Hypertension: Patients hypertension is well controlled on regimen of bystolic. Denies blurred vision or dizziness. Patient notes that she is checking BP at home with normal readings. Notes she has not been sleeping well the past few days, has had a headache x2 days. Lisinopril in the past with side effects. Has also used clonidine. Vitals:    03/29/22 1600 03/29/22 1618   BP: (!) 172/123 (!) 170/98   Resp: 18    Temp: 98.1 °F (36.7 °C)    Weight: 278 lb 12.8 oz (126.5 kg)    Height: 6' (1.829 m)      Patient Active Problem List   Diagnosis Code    Anemia D64.9    Anxiety F41.9    Hypertension I10    Polycystic ovaries E28.2    Obesity, Class II, BMI 35-39.9 E66.9    Feeding drive B29. 0XXA    Hyperlipidemia E78.5    Type 2 diabetes mellitus without complication ([facility] X42.2    Vitamin D deficiency E55.9     Patient Active Problem List    Diagnosis Date Noted    Type 2 diabetes mellitus without complication ([facility] 19/66/0728    Vitamin D deficiency 03/17/2021    Hyperlipidemia 11/12/2020    Obesity, Class II, BMI 35-39.9 09/02/2020    Feeding drive 75/88/2876    Anemia     Anxiety     Hypertension     Polycystic ovaries      Current Outpatient Medications   Medication Sig Dispense Refill    metFORMIN ER (GLUCOPHAGE XR) 500 mg tablet Take 3 Tablets by mouth daily (with dinner).  Indications: type 2 diabetes mellitus 270 Tablet 1    dapagliflozin (Farxiga) 5 mg tab tablet Take 1 Tablet by mouth daily. 90 Tablet 1    rosuvastatin (CRESTOR) 5 mg tablet Take 1 tablet by mouth nightly 90 Tablet 1    hydroCHLOROthiazide (HYDRODIURIL) 12.5 mg tablet Take 1 Tablet by mouth daily. 30 Tablet 0    nebivoloL (Bystolic) 20 mg tablet Take 1 Tablet by mouth daily. Indications: high blood pressure 90 Tablet 1     Allergies   Allergen Reactions    Tradjenta [Linagliptin] Other (comments)     Upset stomach      Past Medical History:   Diagnosis Date    Anemia     Anxiety     Diabetes ([facility]     Hypercholesterolemia     Hypertension     Polycystic ovaries      History reviewed. No pertinent surgical history. Family History   Problem Relation Age of Onset    Diabetes Other     Elevated Lipids Other     Heart Attack Other     Thyroid Disease Other     Hypertension Mother     Hypertension Father      Social History     Tobacco Use    Smoking status: Never Smoker    Smokeless tobacco: Never Used   Substance Use Topics    Alcohol use: Never           ROS    Physical Exam      ASSESSMENT and PLAN  Diagnoses and all orders for this visit:    1. Type 2 diabetes mellitus without complication, unspecified whether long term insulin use ([facility]  Comments:  well controlled. Orders:  -     CBC WITH AUTOMATED DIFF  -     METABOLIC PANEL, COMPREHENSIVE  -     LIPID PANEL  -     HEMOGLOBIN A1C WITH EAG  -     metFORMIN ER (GLUCOPHAGE XR) 500 mg tablet; Take 3 Tablets by mouth daily (with dinner). Indications: type 2 diabetes mellitus  -     dapagliflozin (Farxiga) 5 mg tab tablet; Take 1 Tablet by mouth daily. 2. Hyperlipidemia, unspecified hyperlipidemia type  Comments:  stable. Orders:  -     rosuvastatin (CRESTOR) 5 mg tablet; Take 1 tablet by mouth nightly    3. Essential hypertension  Comments:  add on HCTZ. monitor BP at home and f/u in [facility]. Orders:  -     hydroCHLOROthiazide (HYDRODIURIL) 12.5 mg tablet; Take 1 Tablet by mouth daily. -     nebivoloL (Bystolic) 20 mg tablet;  Take 1 Tablet by mouth daily. Indications: high blood pressure         Devora Reynolds, NP

## 2022-03-29 NOTE — PROGRESS NOTES
Room:     Identified pt with two pt identifiers(name and ). Reviewed record in preparation for visit and have obtained necessary documentation. All patient medications has been reviewed. Chief Complaint   Patient presents with    Diabetes    Follow-up       Health Maintenance Due   Topic    Hepatitis C Screening     Pneumococcal 0-64 years (1 of 2 - PPSV23)    Foot Exam Q1     Eye Exam Retinal or Dilated     DTaP/Tdap/Td series (1 - Tdap)    Cervical cancer screen     Flu Vaccine (1)    COVID-19 Vaccine (3 - Booster for Moderna series)       Vitals:    22 1600   Resp: 18   Weight: 278 lb 12.8 oz (126.5 kg)   Height: 6' (1.829 m)   PainSc:   0 - No pain   LMP: 2022           Patient is accompanied by self I have received verbal consent from [facility] to discuss any/all medical information while they are present in the room.

## 2022-04-07 ENCOUNTER — PATIENT MESSAGE (OUTPATIENT)
Dept: PRIMARY CARE CLINIC | Age: 47
End: 2022-04-07

## 2022-04-07 DIAGNOSIS — I10 ESSENTIAL HYPERTENSION: Primary | ICD-10-CM

## 2022-04-07 DIAGNOSIS — E11.9 TYPE 2 DIABETES MELLITUS WITHOUT COMPLICATION, UNSPECIFIED WHETHER LONG TERM INSULIN USE (HCC): Chronic | ICD-10-CM

## 2022-04-07 LAB
ALBUMIN SERPL-MCNC: 4.6 G/DL (ref 3.8–4.8)
ALBUMIN/GLOB SERPL: 1.9 {RATIO} (ref 1.2–2.2)
ALP SERPL-CCNC: 94 IU/L (ref 44–121)
ALT SERPL-CCNC: 16 IU/L (ref 0–32)
AST SERPL-CCNC: 12 IU/L (ref 0–40)
BASOPHILS # BLD AUTO: 0 X10E3/UL (ref 0–0.2)
BASOPHILS NFR BLD AUTO: 1 %
BILIRUB SERPL-MCNC: 0.7 MG/DL (ref 0–1.2)
BUN SERPL-MCNC: 12 MG/DL (ref 6–24)
BUN/CREAT SERPL: 17 (ref 9–23)
CALCIUM SERPL-MCNC: 9.5 MG/DL (ref 8.7–10.2)
CHLORIDE SERPL-SCNC: 96 MMOL/L (ref 96–106)
CHOLEST SERPL-MCNC: 146 MG/DL (ref 100–199)
CO2 SERPL-SCNC: 23 MMOL/L (ref 20–29)
CREAT SERPL-MCNC: 0.69 MG/DL (ref 0.57–1)
EGFR: 110 ML/MIN/1.73
EOSINOPHIL # BLD AUTO: 0.1 X10E3/UL (ref 0–0.4)
EOSINOPHIL NFR BLD AUTO: 1 %
ERYTHROCYTE [DISTWIDTH] IN BLOOD BY AUTOMATED COUNT: 11.2 % (ref 11.7–15.4)
EST. AVERAGE GLUCOSE BLD GHB EST-MCNC: 163 MG/DL
GLOBULIN SER CALC-MCNC: 2.4 G/DL (ref 1.5–4.5)
GLUCOSE SERPL-MCNC: 178 MG/DL (ref 65–99)
HBA1C MFR BLD: 7.3 % (ref 4.8–5.6)
HCT VFR BLD AUTO: 43 % (ref 34–46.6)
HDLC SERPL-MCNC: 36 MG/DL
HGB BLD-MCNC: 13.9 G/DL (ref 11.1–15.9)
IMM GRANULOCYTES # BLD AUTO: 0 X10E3/UL (ref 0–0.1)
IMM GRANULOCYTES NFR BLD AUTO: 0 %
LDLC SERPL CALC-MCNC: 92 MG/DL (ref 0–99)
LYMPHOCYTES # BLD AUTO: 3.3 X10E3/UL (ref 0.7–3.1)
LYMPHOCYTES NFR BLD AUTO: 42 %
MCH RBC QN AUTO: 30.1 PG (ref 26.6–33)
MCHC RBC AUTO-ENTMCNC: 32.3 G/DL (ref 31.5–35.7)
MCV RBC AUTO: 93 FL (ref 79–97)
MONOCYTES # BLD AUTO: 0.5 X10E3/UL (ref 0.1–0.9)
MONOCYTES NFR BLD AUTO: 7 %
NEUTROPHILS # BLD AUTO: 4 X10E3/UL (ref 1.4–7)
NEUTROPHILS NFR BLD AUTO: 49 %
PLATELET # BLD AUTO: 280 X10E3/UL (ref 150–450)
POTASSIUM SERPL-SCNC: 4.2 MMOL/L (ref 3.5–5.2)
PROT SERPL-MCNC: 7 G/DL (ref 6–8.5)
RBC # BLD AUTO: 4.62 X10E6/UL (ref 3.77–5.28)
SODIUM SERPL-SCNC: 140 MMOL/L (ref 134–144)
TRIGL SERPL-MCNC: 98 MG/DL (ref 0–149)
VLDLC SERPL CALC-MCNC: 18 MG/DL (ref 5–40)
WBC # BLD AUTO: 8 X10E3/UL (ref 3.4–10.8)

## 2022-04-07 NOTE — TELEPHONE ENCOUNTER
From: Keren Christie  To: Kenisha Hale NP  Sent: 4/7/2022 9:48 AM EDT  Subject: Question     Is it possible for my meds to be changed? My A1C isnt moving. Im eating as healthy as I can. Even started going to the gym and walking. Can I try Januvia? At one time you wanted me on it. The insurance I had wouldnt approve it at that time. Can I see if my new insurance will approve it? My bp has been better. Last reading was 140/90.

## 2022-04-09 ENCOUNTER — PATIENT MESSAGE (OUTPATIENT)
Dept: PRIMARY CARE CLINIC | Age: 47
End: 2022-04-09

## 2022-04-09 DIAGNOSIS — E78.2 MIXED HYPERLIPIDEMIA: Primary | ICD-10-CM

## 2022-04-09 DIAGNOSIS — I10 ESSENTIAL HYPERTENSION: ICD-10-CM

## 2022-04-12 RX ORDER — VALSARTAN 160 MG/1
160 TABLET ORAL DAILY
Qty: 30 TABLET | Refills: 0 | Status: SHIPPED | OUTPATIENT
Start: 2022-04-12 | End: 2022-05-11 | Stop reason: SDUPTHER

## 2022-04-12 RX ORDER — VALSARTAN 160 MG/1
160 TABLET ORAL DAILY
Qty: 30 TABLET | Refills: 0 | Status: SHIPPED | OUTPATIENT
Start: 2022-04-12 | End: 2022-04-12

## 2022-04-12 NOTE — TELEPHONE ENCOUNTER
From: Rickie Nissen Tibbsmorton  To: Kelley Nielsen NP  Sent: 4/9/2022 9:37 AM EDT  Subject: Bp Readings    Good Morning! I had to purchase a new bp monitor yesterday. The other one was not accurate according to my s doctor. Yesterdays reading was 158/88 and this morning was 147/84. I will take a few more readings through the weekend and let you know. I would like to remain on the water pill if at all possible. Sometimes Mayda noticed I was having trouble releasing water. This pill has worked for me as Im urinating more. Ill give you an update on Monday. Thanks!

## 2022-04-15 ENCOUNTER — PATIENT MESSAGE (OUTPATIENT)
Dept: PRIMARY CARE CLINIC | Age: 47
End: 2022-04-15

## 2022-04-15 DIAGNOSIS — I10 ESSENTIAL HYPERTENSION: Primary | ICD-10-CM

## 2022-04-15 RX ORDER — HYDROCHLOROTHIAZIDE 25 MG/1
25 TABLET ORAL DAILY
Qty: 30 TABLET | Refills: 0 | Status: SHIPPED | OUTPATIENT
Start: 2022-04-15 | End: 2022-05-11 | Stop reason: SDUPTHER

## 2022-04-15 NOTE — TELEPHONE ENCOUNTER
From: Iwona Christie  To: Ayaz Barclay NP  Sent: 4/15/2022 1:14 PM EDT  Subject: Water pill     Am I still suppose to take the water pill? I have enough to last through Monday I believe. Please let me know.

## 2022-04-28 ENCOUNTER — TELEPHONE (OUTPATIENT)
Dept: PRIMARY CARE CLINIC | Age: 47
End: 2022-04-28

## 2022-04-28 ENCOUNTER — VIRTUAL VISIT (OUTPATIENT)
Dept: PRIMARY CARE CLINIC | Age: 47
End: 2022-04-28
Payer: COMMERCIAL

## 2022-04-28 DIAGNOSIS — G47.9 TROUBLE IN SLEEPING: ICD-10-CM

## 2022-04-28 DIAGNOSIS — I10 ESSENTIAL HYPERTENSION: Primary | ICD-10-CM

## 2022-04-28 PROCEDURE — 99213 OFFICE O/P EST LOW 20 MIN: CPT | Performed by: NURSE PRACTITIONER

## 2022-04-28 NOTE — PROGRESS NOTES
Chief Complaint   Patient presents with    Sleep Problem     Pt states since being on valsartan she hasn't sleep at all for the last two nights. pt states she tried melatonin and it didint help       1. Have you been to the ER, urgent care clinic since your last visit? Hospitalized since your last visit?no     2. Have you seen or consulted any other health care providers outside of the [address] [place] [address] since your last visit? Include any pap smears or colon screening.  no    Visit Vitals  LMP 03/29/2022

## 2022-04-28 NOTE — PROGRESS NOTES
HISTORY OF PRESENT ILLNESS  Arianne Christie is a 42 y.o. female presents via telemedicine for sleeping issue. Patient reported that she is having trouble sleeping. Patient started valsartan 2 weeks ago and feels that this is the cause of her having trouble sleeping over the past 2 days. Has tried melatonin with no relief. Denies stress or changing in life that would be contributing to sleep changes. Patient only slept for 1.5 hours last night. Patient notes that this causes her to have gas. Patient notes that she is also using HCTZ and bystolic. Patient would like to hold valsartan to see if this is the cause. Patient notes that lisinopril did not help with BP previously. There were no vitals filed for this visit. Patient Active Problem List   Diagnosis Code    Anemia D64.9    Anxiety F41.9    Hypertension I10    Polycystic ovaries E28.2    Obesity, Class II, BMI 35-39.9 E66.9    Feeding drive M04. 0XXA    Hyperlipidemia E78.5    Type 2 diabetes mellitus without complication ([facility] T54.6    Vitamin D deficiency E55.9     Patient Active Problem List    Diagnosis Date Noted    Type 2 diabetes mellitus without complication ([facility] 86/91/1052    Vitamin D deficiency 03/17/2021    Hyperlipidemia 11/12/2020    Obesity, Class II, BMI 35-39.9 09/02/2020    Feeding drive 16/52/8936    Anemia     Anxiety     Hypertension     Polycystic ovaries      Current Outpatient Medications   Medication Sig Dispense Refill    hydroCHLOROthiazide (HYDRODIURIL) 25 mg tablet Take 1 Tablet by mouth daily. 30 Tablet 0    dapagliflozin (Farxiga) 10 mg tab tablet Take 1 Tablet by mouth daily. 90 Tablet 1    valsartan (DIOVAN) 160 mg tablet Take 1 Tablet by mouth daily. 30 Tablet 0    metFORMIN ER (GLUCOPHAGE XR) 500 mg tablet Take 3 Tablets by mouth daily (with dinner).  Indications: type 2 diabetes mellitus 270 Tablet 1    rosuvastatin (CRESTOR) 5 mg tablet Take 1 tablet by mouth nightly 90 Tablet 1  nebivoloL (Bystolic) 20 mg tablet Take 1 Tablet by mouth daily. Indications: high blood pressure 90 Tablet 1     Allergies   Allergen Reactions    Tradjenta [Linagliptin] Other (comments)     Upset stomach      Past Medical History:   Diagnosis Date    Anemia     Anxiety     Diabetes ([address]     Hypercholesterolemia     Hypertension     Polycystic ovaries      History reviewed. No pertinent surgical history. Family History   Problem Relation Age of Onset    Diabetes Other     Elevated Lipids Other     Heart Attack Other     Thyroid Disease Other     Hypertension Mother     Hypertension Father      Social History     Tobacco Use    Smoking status: Never Smoker    Smokeless tobacco: Never Used   Substance Use Topics    Alcohol use: Never           Review of Systems   Eyes: Negative for blurred vision and double vision. Cardiovascular: Negative for chest pain and palpitations. Neurological: Negative for dizziness and headaches. Psychiatric/Behavioral: The patient has insomnia. Physical Exam  Constitutional:       Appearance: Normal appearance. HENT:      Head: Normocephalic. Eyes:      Conjunctiva/sclera: Conjunctivae normal.   Pulmonary:      Effort: Pulmonary effort is normal.   Skin:     General: Skin is warm and dry. Neurological:      Mental Status: She is alert and oriented to person, place, and time. Psychiatric:         Mood and Affect: Mood normal.         Behavior: Behavior normal.           ASSESSMENT and PLAN  Diagnoses and all orders for this visit:    1. Essential hypertension  Comments:  hold valsartan for next few days to see if symptoms resolve. If they do, will consider a different ACE or ARB for BP. 2. Trouble in sleeping  Comments:  good sleep hygiene. If does not resolve with stopping valsartan, will consider trazodone for sleep aid.           Karine Rockwell, who was evaluated through a synchronous (real-time) audio-video encounter, and/or her healthcare decision maker, is aware that it is a billable service, with coverage as determined by her insurance carrier. She provided verbal consent to proceed: Yes, and patient identification was verified. It was conducted pursuant to the emergency declaration under the [address] [place] [address] and the [employer] General Act. A caregiver was present when appropriate. Ability to conduct physical exam was limited. I was at home. The patient was at home. Buddy Avilez is a 42 y.o. female being evaluated by a Virtual Visit (video visit) encounter to address concerns as mentioned above. A caregiver was present when appropriate. Due to this being a TeleHealth encounter (During [facility] public health emergency), evaluation of the following organ systems was limited: Vitals/Constitutional/EENT/Resp/CV/GI//MS/Neuro/Skin/Heme-Lymph-Imm. Pursuant to the emergency declaration under the [address] [place] [address] and the [employer] and [employer] Act, this Virtual Visit was conducted with patient's (and/or legal guardian's) consent, to reduce the risk of exposure to COVID-19 and provide necessary medical care. Services were provided through a video synchronous discussion virtually to substitute for in-person encounter. --Anne Marie Valencia NP on 4/28/2022 at 2:19 PM    An electronic signature was used to authenticate this note.

## 2022-05-11 ENCOUNTER — TELEPHONE (OUTPATIENT)
Dept: PRIMARY CARE CLINIC | Age: 47
End: 2022-05-11

## 2022-05-11 ENCOUNTER — CLINICAL SUPPORT (OUTPATIENT)
Dept: PRIMARY CARE CLINIC | Age: 47
End: 2022-05-11

## 2022-05-11 VITALS — DIASTOLIC BLOOD PRESSURE: 73 MMHG | SYSTOLIC BLOOD PRESSURE: 128 MMHG | HEART RATE: 78 BPM

## 2022-05-11 DIAGNOSIS — I10 PRIMARY HYPERTENSION: Primary | ICD-10-CM

## 2022-05-11 NOTE — TELEPHONE ENCOUNTER
Thank you for the clarification. Since she reports tolerating them, I sent in refills for both the HCTZ and Valsartan.     Dr. Fabiano Roberts

## 2022-05-11 NOTE — TELEPHONE ENCOUNTER
Patient in today for blood pressure check, 128/73 HR 78. Patient asked that Irma Pena is informed of her bp reading so that she can send her medication to the pharmacy. Please advise.

## 2022-05-11 NOTE — TELEPHONE ENCOUNTER
Pt states she hadn't stop any medication. Pt has been taking bystolic, valsartan, and htcz. Pt only needs refills on htcz and valsartan.  I will send you the refills request

## 2022-05-11 NOTE — TELEPHONE ENCOUNTER
Can we call and confirm-was the BP today on just the HCTZ and bystolic? How long has she been off the valsartan.

## 2022-05-11 NOTE — TELEPHONE ENCOUNTER
Looks like pt was taking valsartan and htcz. Pt was having sleeping trouble with the valsartan. According to [place] note she wanted her too \"hold valsartan for next few days to see if symptoms resolve. If they do, will consider a different ACE or ARB for BP. \" she needs refills on bp meds but unsure what to refill. Should this wait until [place] comes back?

## 2022-06-07 DIAGNOSIS — I10 ESSENTIAL HYPERTENSION: ICD-10-CM

## 2022-06-07 RX ORDER — NEBIVOLOL 20 MG/1
20 TABLET ORAL DAILY
Qty: 90 TABLET | Refills: 1 | Status: SHIPPED | OUTPATIENT
Start: 2022-06-07

## 2022-06-14 DIAGNOSIS — E11.9 TYPE 2 DIABETES MELLITUS WITHOUT COMPLICATION, UNSPECIFIED WHETHER LONG TERM INSULIN USE (HCC): Chronic | ICD-10-CM

## 2022-06-14 RX ORDER — METFORMIN HYDROCHLORIDE 500 MG/1
1500 TABLET, EXTENDED RELEASE ORAL
Qty: 270 TABLET | Refills: 1 | Status: SHIPPED | OUTPATIENT
Start: 2022-06-14

## 2022-07-31 ENCOUNTER — APPOINTMENT (OUTPATIENT)
Dept: GENERAL RADIOLOGY | Age: 47
End: 2022-07-31
Attending: NURSE PRACTITIONER
Payer: COMMERCIAL

## 2022-07-31 ENCOUNTER — HOSPITAL ENCOUNTER (EMERGENCY)
Age: 47
Discharge: HOME | End: 2022-07-31
Attending: STUDENT IN AN ORGANIZED HEALTH CARE EDUCATION/TRAINING PROGRAM
Payer: COMMERCIAL

## 2022-07-31 VITALS
OXYGEN SATURATION: 98 % | HEART RATE: 74 BPM | TEMPERATURE: 98.1 F | WEIGHT: 272 LBS | HEIGHT: 72 IN | SYSTOLIC BLOOD PRESSURE: 133 MMHG | DIASTOLIC BLOOD PRESSURE: 86 MMHG | RESPIRATION RATE: 16 BRPM | BODY MASS INDEX: 36.84 KG/M2

## 2022-07-31 DIAGNOSIS — M54.6 ACUTE BILATERAL THORACIC BACK PAIN: ICD-10-CM
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
## 2022-07-31 PROCEDURE — 74011250637 HC RX REV CODE- 250/637: Performed by: NURSE PRACTITIONER

## 2022-07-31 PROCEDURE — 72070 X-RAY EXAM THORAC SPINE 2VWS: CPT

## 2022-07-31 PROCEDURE — 99283 EMERGENCY DEPT VISIT LOW MDM: CPT

## 2022-07-31 PROCEDURE — 74011000250 HC RX REV CODE- 250: Performed by: NURSE PRACTITIONER

## 2022-07-31 RX ORDER — LIDOCAINE 50 MG/G
PATCH TOPICAL
Qty: 15 EACH | Refills: 0 | Status: SHIPPED | OUTPATIENT
Start: 2022-07-31 | End: 2022-08-09

## 2022-07-31 RX ORDER — LIDOCAINE 4 G/100G
1 PATCH TOPICAL
Status: DISCONTINUED | OUTPATIENT
Start: 2022-07-31 | End: 2022-07-31 | Stop reason: HOSPADM

## 2022-07-31 RX ORDER — IBUPROFEN 600 MG/1
600 TABLET ORAL
Qty: 20 TABLET | Refills: 0 | Status: SHIPPED | OUTPATIENT
Start: 2022-07-31 | End: 2022-08-09 | Stop reason: SDUPTHER

## 2022-07-31 RX ORDER — IBUPROFEN 600 MG/1
600 TABLET ORAL
Status: COMPLETED | OUTPATIENT
Start: 2022-07-31 | End: 2022-07-31

## 2022-07-31 RX ORDER — CYCLOBENZAPRINE HCL 10 MG
10 TABLET ORAL
Status: COMPLETED | OUTPATIENT
Start: 2022-07-31 | End: 2022-07-31

## 2022-07-31 RX ORDER — CYCLOBENZAPRINE HCL 10 MG
10 TABLET ORAL
Qty: 10 TABLET | Refills: 0 | Status: SHIPPED | OUTPATIENT
Start: 2022-07-31 | End: 2022-08-09 | Stop reason: ALTCHOICE

## 2022-07-31 RX ADMIN — CYCLOBENZAPRINE 10 MG: 10 TABLET, FILM COATED ORAL at 18:10

## 2022-07-31 RX ADMIN — IBUPROFEN 600 MG: 600 TABLET, FILM COATED ORAL at 18:10
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
## 2022-07-31 NOTE — DISCHARGE INSTRUCTIONS
Ice and ice to the areas of discomfort 20 minutes per every 2 hours for the first 3 days. You may alternate on ibuprofen, however I would like for you to continue to take the ibuprofen for 7 to 14 days until pain is resolved. If you continue to have pain please follow-up with your PCP or [address] [place] [address], consider contacting primary care for outpatient physical therapy order.
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
## 2022-08-04 ENCOUNTER — TELEPHONE (OUTPATIENT)
Dept: PRIMARY CARE CLINIC | Age: 47
End: 2022-08-04
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
## 2022-08-04 NOTE — TELEPHONE ENCOUNTER
The 2:40 on 8/9 is not new the the practice but is in a 40 minute time slot. You can put this patient in at 3 on 8/9 to make each of those appointments a 20 minute.

## 2022-08-09 ENCOUNTER — OFFICE VISIT (OUTPATIENT)
Dept: PRIMARY CARE CLINIC | Age: 47
End: 2022-08-09
Payer: COMMERCIAL

## 2022-08-09 VITALS
HEART RATE: 79 BPM | HEIGHT: 72 IN | DIASTOLIC BLOOD PRESSURE: 76 MMHG | BODY MASS INDEX: 37.38 KG/M2 | TEMPERATURE: 97.8 F | OXYGEN SATURATION: 96 % | RESPIRATION RATE: 18 BRPM | SYSTOLIC BLOOD PRESSURE: 159 MMHG | WEIGHT: 276 LBS

## 2022-08-09 DIAGNOSIS — M54.50 ACUTE BILATERAL LOW BACK PAIN WITHOUT SCIATICA: Primary | ICD-10-CM

## 2022-08-09 DIAGNOSIS — E78.5 HYPERLIPIDEMIA, UNSPECIFIED HYPERLIPIDEMIA TYPE: Chronic | ICD-10-CM

## 2022-08-09 DIAGNOSIS — I10 ESSENTIAL HYPERTENSION: ICD-10-CM

## 2022-08-09 DIAGNOSIS — E11.9 TYPE 2 DIABETES MELLITUS WITHOUT COMPLICATION, UNSPECIFIED WHETHER LONG TERM INSULIN USE (HCC): Chronic | ICD-10-CM

## 2022-08-09 DIAGNOSIS — F41.1 GENERALIZED ANXIETY DISORDER: ICD-10-CM

## 2022-08-09 PROCEDURE — 3051F HG A1C>EQUAL 7.0%<8.0%: CPT | Performed by: NURSE PRACTITIONER

## 2022-08-09 PROCEDURE — 99214 OFFICE O/P EST MOD 30 MIN: CPT | Performed by: NURSE PRACTITIONER

## 2022-08-09 RX ORDER — BUSPIRONE HYDROCHLORIDE 5 MG/1
5 TABLET ORAL
Qty: 60 TABLET | Refills: 2 | Status: SHIPPED | OUTPATIENT
Start: 2022-08-09

## 2022-08-09 RX ORDER — HYDROCHLOROTHIAZIDE 25 MG/1
25 TABLET ORAL DAILY
Qty: 90 TABLET | Refills: 1 | Status: SHIPPED | OUTPATIENT
Start: 2022-08-09

## 2022-08-09 RX ORDER — METHOCARBAMOL 500 MG/1
500 TABLET, FILM COATED ORAL
Qty: 30 TABLET | Refills: 0 | Status: SHIPPED | OUTPATIENT
Start: 2022-08-09

## 2022-08-09 RX ORDER — ROSUVASTATIN CALCIUM 5 MG/1
TABLET, COATED ORAL
Qty: 90 TABLET | Refills: 1 | Status: SHIPPED | OUTPATIENT
Start: 2022-08-09

## 2022-08-09 RX ORDER — VALSARTAN 160 MG/1
160 TABLET ORAL DAILY
Qty: 90 TABLET | Refills: 1 | Status: SHIPPED | OUTPATIENT
Start: 2022-08-09

## 2022-08-09 RX ORDER — IBUPROFEN 600 MG/1
600 TABLET ORAL
Qty: 20 TABLET | Refills: 0 | Status: SHIPPED | OUTPATIENT
Start: 2022-08-09
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
## 2022-08-11 ENCOUNTER — PATIENT MESSAGE (OUTPATIENT)
Dept: PRIMARY CARE CLINIC | Age: 47
End: 2022-08-11

## 2022-08-11 NOTE — TELEPHONE ENCOUNTER
From: Yaa Bryant  To: Odilia Holland NP  Sent: 8/11/2022 7:05 AM EDT  Subject: Side effects     I took the methocarbamol on Tuesday after the appointment. I currently am not able to stand or walk from the dizziness. What can I do about this? I had to call out from work today.

## 2022-08-11 NOTE — LETTER
NOTIFICATION RETURN TO WORK / SCHOOL    8/11/2022 2:20 PM    Ms. [address] [facility]      To Whom It May Concern:    Brittany Malik is currently under the care of Jovana Dallas. Please excuse her from work on 8/11/22. She will return to work/school on: 8/12/22      If there are questions or concerns please have the patient contact our office.         Sincerely,      Florina Flannery NP

## 2022-12-08 ENCOUNTER — VIRTUAL VISIT (OUTPATIENT)
Dept: PRIMARY CARE CLINIC | Age: 47
End: 2022-12-08
Payer: COMMERCIAL

## 2022-12-08 DIAGNOSIS — I10 ESSENTIAL HYPERTENSION: Chronic | ICD-10-CM

## 2022-12-08 DIAGNOSIS — E11.9 TYPE 2 DIABETES MELLITUS WITHOUT COMPLICATION, UNSPECIFIED WHETHER LONG TERM INSULIN USE (HCC): Chronic | ICD-10-CM

## 2022-12-08 DIAGNOSIS — E78.5 HYPERLIPIDEMIA, UNSPECIFIED HYPERLIPIDEMIA TYPE: Chronic | ICD-10-CM

## 2022-12-08 RX ORDER — METFORMIN HYDROCHLORIDE 500 MG/1
1500 TABLET, EXTENDED RELEASE ORAL
Qty: 270 TABLET | Refills: 1 | Status: SHIPPED | OUTPATIENT
Start: 2022-12-08

## 2022-12-08 RX ORDER — HYDROCHLOROTHIAZIDE 25 MG/1
25 TABLET ORAL DAILY
Qty: 90 TABLET | Refills: 1 | Status: SHIPPED | OUTPATIENT
Start: 2022-12-08

## 2022-12-08 RX ORDER — VALSARTAN 160 MG/1
160 TABLET ORAL DAILY
Qty: 90 TABLET | Refills: 1 | Status: SHIPPED | OUTPATIENT
Start: 2022-12-08

## 2022-12-08 RX ORDER — ROSUVASTATIN CALCIUM 5 MG/1
TABLET, COATED ORAL
Qty: 90 TABLET | Refills: 1 | Status: SHIPPED | OUTPATIENT
Start: 2022-12-08

## 2022-12-08 RX ORDER — NEBIVOLOL 20 MG/1
20 TABLET ORAL DAILY
Qty: 90 TABLET | Refills: 1 | Status: SHIPPED | OUTPATIENT
Start: 2022-12-08

## 2022-12-08 NOTE — PROGRESS NOTES
Chief Complaint   Patient presents with    Follow Up Chronic Condition     diabetes    Labs    Medication Refill       There were no vitals taken for this visit. 1. Have you been to the ER, urgent care clinic since your last visit? Hospitalized since your last visit? No    2. Have you seen or consulted any other health care providers outside of the [address] [place] [address] since your last visit? Include any pap smears or colon screening.  No

## 2022-12-08 NOTE — PROGRESS NOTES
HISTORY OF PRESENT ILLNESS  Neymar Bennett is a 41 y.o. female presents via telemedicine for follow up diabetes. Diabetes: Last A1c was         Lab Results   Component Value Date/Time     Hemoglobin A1c 7.5 (H) 10/12/2021 03:20 PM     Diabetes well controlled on metformin and farxiga. Patient is due for eye exam, next apt is on Saturday. Denies neuropathy. Has not been checking glucose at home. Notes she is under stress ( recently dx with cancer) and has not been eating the healthiest dinners, is eating mostly salads at lunch time. Hyperlipidemia: Mixed hyperlipidemia well controlled on crestor. Denies any leg cramps or malaise from this medication. Reported compliance with taking medication daily. Hypertension: Patients hypertension is well controlled on regimen of bystolic, HCTZ and valsartan Denies blurred vision or dizziness. Patient notes that she is checking BP at home with normal readings. There were no vitals filed for this visit. Patient Active Problem List   Diagnosis Code    Anemia D64.9    Anxiety F41.9    Hypertension I10    Polycystic ovaries E28.2    Obesity, Class II, BMI 35-39.9 E66.9    Feeding drive U59. 0XXA    Hyperlipidemia E78.5    Type 2 diabetes mellitus without complication ([facility]) F55.7    Vitamin D deficiency E55.9     Patient Active Problem List    Diagnosis Date Noted    Type 2 diabetes mellitus without complication ([facility] 09/39/1628    Vitamin D deficiency 03/17/2021    Hyperlipidemia 11/12/2020    Obesity, Class II, BMI 35-39.9 09/02/2020    Feeding drive 97/80/0026    Anemia     Anxiety     Hypertension     Polycystic ovaries      Current Outpatient Medications   Medication Sig Dispense Refill    dapagliflozin (Farxiga) 10 mg tab tablet Take 1 Tablet by mouth daily. 90 Tablet 1    hydroCHLOROthiazide (HYDRODIURIL) 25 mg tablet Take 1 Tablet by mouth daily.  90 Tablet 1    metFORMIN ER (GLUCOPHAGE XR) 500 mg tablet Take 3 Tablets by mouth daily (with dinner). Indications: type 2 diabetes mellitus 270 Tablet 1    nebivoloL (Bystolic) 20 mg tablet Take 1 Tablet by mouth daily. Indications: high blood pressure 90 Tablet 1    rosuvastatin (CRESTOR) 5 mg tablet Take 1 tablet by mouth nightly 90 Tablet 1    valsartan (DIOVAN) 160 mg tablet Take 1 Tablet by mouth daily. 90 Tablet 1     Allergies   Allergen Reactions    Tradjenta [Linagliptin] Other (comments)     Upset stomach      Past Medical History:   Diagnosis Date    Anemia     Anxiety     Diabetes ([address]     Hypercholesterolemia     Hypertension     Polycystic ovaries      History reviewed. No pertinent surgical history. Family History   Problem Relation Age of Onset    Diabetes Other     Elevated Lipids Other     Heart Attack Other     Thyroid Disease Other     Hypertension Mother     Hypertension Father      Social History     Tobacco Use    Smoking status: Never    Smokeless tobacco: Never   Substance Use Topics    Alcohol use: Never           Review of Systems   Constitutional:  Negative for malaise/fatigue and weight loss. Eyes:  Negative for blurred vision and double vision. Respiratory:  Negative for shortness of breath. Cardiovascular:  Negative for chest pain and palpitations. Neurological:  Negative for dizziness, tingling, tremors and headaches. Psychiatric/Behavioral:  The patient is not nervous/anxious and does not have insomnia. Physical Exam  Constitutional:       Appearance: Normal appearance. HENT:      Head: Normocephalic. Eyes:      Conjunctiva/sclera: Conjunctivae normal.   Pulmonary:      Effort: Pulmonary effort is normal.   Skin:     General: Skin is dry. Neurological:      Mental Status: She is alert and oriented to person, place, and time. Psychiatric:         Mood and Affect: Mood normal.         Behavior: Behavior normal.         ASSESSMENT and PLAN  Diagnoses and all orders for this visit:    1.  Type 2 diabetes mellitus without complication, unspecified whether long term insulin use (HCC)  Comments:  unclear control. Checking labs   Orders:  -     dapagliflozin (Farxiga) 10 mg tab tablet; Take 1 Tablet by mouth daily. -     metFORMIN ER (GLUCOPHAGE XR) 500 mg tablet; Take 3 Tablets by mouth daily (with dinner). Indications: type 2 diabetes mellitus  -     CBC WITH AUTOMATED DIFF  -     HEMOGLOBIN A1C WITH EAG  -     LIPID PANEL  -     METABOLIC PANEL, COMPREHENSIVE  -     MICROALBUMIN, UR, RAND W/ MICROALB/CREAT RATIO    2. Essential hypertension  Comments:  well controlled  Orders:  -     hydroCHLOROthiazide (HYDRODIURIL) 25 mg tablet; Take 1 Tablet by mouth daily. -     nebivoloL (Bystolic) 20 mg tablet; Take 1 Tablet by mouth daily. Indications: high blood pressure  -     valsartan (DIOVAN) 160 mg tablet; Take 1 Tablet by mouth daily. 3. Hyperlipidemia, unspecified hyperlipidemia type  Comments:  stable. Orders:  -     rosuvastatin (CRESTOR) 5 mg tablet; Take 1 tablet by mouth nightly       Vasquez Montesinos, who was evaluated through a synchronous (real-time) audio-video encounter, and/or her healthcare decision maker, is aware that it is a billable service, with coverage as determined by her insurance carrier. She provided verbal consent to proceed: Yes, and patient identification was verified. It was conducted pursuant to the emergency declaration under the [address] [place] [address] authority and the [employer] and [employer] Act. A caregiver was present when appropriate. Ability to conduct physical exam was limited. I was at home. The patient was at home. Irene Dowell is a 41 y.o. female being evaluated by a Virtual Visit (video visit) encounter to address concerns as mentioned above. A caregiver was present when appropriate.  Due to this being a TeleHealth encounter (During ZZCCZ-84 public health emergency), evaluation of the following organ systems was limited: Vitals/Constitutional/EENT/Resp/CV/GI//MS/Neuro/Skin/Heme-Lymph-Imm. Pursuant to the emergency declaration under the [address] [place] [address] and the [employer] and [employer] Act, this Virtual Visit was conducted with patient's (and/or legal guardian's) consent, to reduce the risk of exposure to COVID-19 and provide necessary medical care. Services were provided through a video synchronous discussion virtually to substitute for in-person encounter. --Padmini Callahan NP on 12/8/2022 at 10:42 AM    An electronic signature was used to authenticate this note.

## 2022-12-20 ENCOUNTER — PATIENT MESSAGE (OUTPATIENT)
Dept: PRIMARY CARE CLINIC | Age: 47
End: 2022-12-20

## 2022-12-20 DIAGNOSIS — E11.9 TYPE 2 DIABETES MELLITUS WITHOUT COMPLICATION, UNSPECIFIED WHETHER LONG TERM INSULIN USE (HCC): Chronic | ICD-10-CM

## 2022-12-21 RX ORDER — METFORMIN HYDROCHLORIDE 500 MG/1
2000 TABLET, EXTENDED RELEASE ORAL
Qty: 360 TABLET | Refills: 1 | Status: SHIPPED | OUTPATIENT
Start: 2022-12-21
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
## 2023-05-26 RX ORDER — VALSARTAN 160 MG/1
160 TABLET ORAL DAILY
COMMUNITY
Start: 2022-12-08 | End: 2023-06-20

## 2023-05-26 RX ORDER — METFORMIN HYDROCHLORIDE 500 MG/1
2000 TABLET, EXTENDED RELEASE ORAL
COMMUNITY
Start: 2023-04-05 | End: 2023-06-29 | Stop reason: SDUPTHER

## 2023-05-26 RX ORDER — ROSUVASTATIN CALCIUM 5 MG/1
1 TABLET, COATED ORAL NIGHTLY
COMMUNITY
Start: 2022-12-08 | End: 2023-06-29 | Stop reason: SDUPTHER

## 2023-05-26 RX ORDER — NEBIVOLOL 20 MG/1
20 TABLET ORAL DAILY
COMMUNITY
Start: 2022-12-08 | End: 2023-06-20

## 2023-05-26 RX ORDER — HYDROCHLOROTHIAZIDE 25 MG/1
25 TABLET ORAL DAILY
COMMUNITY
Start: 2022-12-08 | End: 2023-06-20

## 2023-06-19 DIAGNOSIS — I10 ESSENTIAL (PRIMARY) HYPERTENSION: ICD-10-CM

## 2023-06-19 DIAGNOSIS — E11.9 TYPE 2 DIABETES MELLITUS WITHOUT COMPLICATIONS (HCC): ICD-10-CM

## 2023-06-20 RX ORDER — HYDROCHLOROTHIAZIDE 25 MG/1
TABLET ORAL
Qty: 90 TABLET | Refills: 1 | Status: SHIPPED | OUTPATIENT
Start: 2023-06-20

## 2023-06-20 RX ORDER — DAPAGLIFLOZIN 10 MG/1
TABLET, FILM COATED ORAL
Qty: 90 TABLET | Refills: 1 | Status: SHIPPED | OUTPATIENT
Start: 2023-06-20

## 2023-06-20 RX ORDER — NEBIVOLOL 20 MG/1
TABLET ORAL
Qty: 90 TABLET | Refills: 1 | Status: SHIPPED | OUTPATIENT
Start: 2023-06-20

## 2023-06-20 RX ORDER — VALSARTAN 160 MG/1
TABLET ORAL
Qty: 90 TABLET | Refills: 1 | Status: SHIPPED | OUTPATIENT
Start: 2023-06-20

## 2023-06-26 SDOH — ECONOMIC STABILITY: FOOD INSECURITY: WITHIN THE PAST 12 MONTHS, THE FOOD YOU BOUGHT JUST DIDN'T LAST AND YOU DIDN'T HAVE MONEY TO GET MORE.: NEVER TRUE

## 2023-06-26 SDOH — ECONOMIC STABILITY: TRANSPORTATION INSECURITY
IN THE PAST 12 MONTHS, HAS LACK OF TRANSPORTATION KEPT YOU FROM MEETINGS, WORK, OR FROM GETTING THINGS NEEDED FOR DAILY LIVING?: NO

## 2023-06-26 SDOH — ECONOMIC STABILITY: FOOD INSECURITY: WITHIN THE PAST 12 MONTHS, YOU WORRIED THAT YOUR FOOD WOULD RUN OUT BEFORE YOU GOT MONEY TO BUY MORE.: NEVER TRUE

## 2023-06-26 SDOH — ECONOMIC STABILITY: INCOME INSECURITY: HOW HARD IS IT FOR YOU TO PAY FOR THE VERY BASICS LIKE FOOD, HOUSING, MEDICAL CARE, AND HEATING?: NOT HARD AT ALL

## 2023-06-26 SDOH — ECONOMIC STABILITY: HOUSING INSECURITY
IN THE LAST 12 MONTHS, WAS THERE A TIME WHEN YOU DID NOT HAVE A STEADY PLACE TO SLEEP OR SLEPT IN A SHELTER (INCLUDING NOW)?: NO

## 2023-06-29 ENCOUNTER — TELEMEDICINE (OUTPATIENT)
Dept: PRIMARY CARE CLINIC | Facility: CLINIC | Age: 48
End: 2023-06-29
Payer: COMMERCIAL

## 2023-06-29 DIAGNOSIS — E78.2 MIXED HYPERLIPIDEMIA: Primary | ICD-10-CM

## 2023-06-29 DIAGNOSIS — I10 ESSENTIAL (PRIMARY) HYPERTENSION: Chronic | ICD-10-CM

## 2023-06-29 DIAGNOSIS — F51.01 PRIMARY INSOMNIA: ICD-10-CM

## 2023-06-29 DIAGNOSIS — E11.9 TYPE 2 DIABETES MELLITUS WITHOUT COMPLICATION, WITHOUT LONG-TERM CURRENT USE OF INSULIN (HCC): ICD-10-CM

## 2023-06-29 PROCEDURE — 99214 OFFICE O/P EST MOD 30 MIN: CPT | Performed by: NURSE PRACTITIONER

## 2023-06-29 RX ORDER — TRAZODONE HYDROCHLORIDE 50 MG/1
50 TABLET ORAL NIGHTLY
Qty: 90 TABLET | Refills: 1 | Status: SHIPPED | OUTPATIENT
Start: 2023-06-29

## 2023-06-29 RX ORDER — ROSUVASTATIN CALCIUM 5 MG/1
5 TABLET, COATED ORAL NIGHTLY
Qty: 90 TABLET | Refills: 1 | Status: SHIPPED | OUTPATIENT
Start: 2023-06-29

## 2023-06-29 RX ORDER — VALSARTAN 160 MG/1
160 TABLET ORAL DAILY
Qty: 90 TABLET | Refills: 1 | Status: SHIPPED | OUTPATIENT
Start: 2023-06-29

## 2023-06-29 RX ORDER — METFORMIN HYDROCHLORIDE 500 MG/1
2000 TABLET, EXTENDED RELEASE ORAL
Qty: 360 TABLET | Refills: 1 | Status: SHIPPED | OUTPATIENT
Start: 2023-06-29

## 2023-06-29 SDOH — ECONOMIC STABILITY: FOOD INSECURITY: WITHIN THE PAST 12 MONTHS, YOU WORRIED THAT YOUR FOOD WOULD RUN OUT BEFORE YOU GOT MONEY TO BUY MORE.: NEVER TRUE

## 2023-06-29 SDOH — ECONOMIC STABILITY: FOOD INSECURITY: WITHIN THE PAST 12 MONTHS, THE FOOD YOU BOUGHT JUST DIDN'T LAST AND YOU DIDN'T HAVE MONEY TO GET MORE.: NEVER TRUE

## 2023-06-29 SDOH — ECONOMIC STABILITY: INCOME INSECURITY: HOW HARD IS IT FOR YOU TO PAY FOR THE VERY BASICS LIKE FOOD, HOUSING, MEDICAL CARE, AND HEATING?: NOT HARD AT ALL

## 2023-06-29 ASSESSMENT — PATIENT HEALTH QUESTIONNAIRE - PHQ9
SUM OF ALL RESPONSES TO PHQ9 QUESTIONS 1 & 2: 0
SUM OF ALL RESPONSES TO PHQ QUESTIONS 1-9: 0
2. FEELING DOWN, DEPRESSED OR HOPELESS: 0
SUM OF ALL RESPONSES TO PHQ QUESTIONS 1-9: 0
SUM OF ALL RESPONSES TO PHQ QUESTIONS 1-9: 0
1. LITTLE INTEREST OR PLEASURE IN DOING THINGS: 0
SUM OF ALL RESPONSES TO PHQ QUESTIONS 1-9: 0

## 2023-07-14 LAB
ALBUMIN SERPL-MCNC: 4.5 G/DL (ref 3.9–4.9)
ALBUMIN/GLOB SERPL: 1.6 {RATIO} (ref 1.2–2.2)
ALP SERPL-CCNC: 93 IU/L (ref 44–121)
ALT SERPL-CCNC: 16 IU/L (ref 0–32)
AST SERPL-CCNC: 15 IU/L (ref 0–40)
BILIRUB SERPL-MCNC: 0.5 MG/DL (ref 0–1.2)
BUN SERPL-MCNC: 12 MG/DL (ref 6–24)
BUN/CREAT SERPL: 16 (ref 9–23)
CALCIUM SERPL-MCNC: 9.7 MG/DL (ref 8.7–10.2)
CHLORIDE SERPL-SCNC: 103 MMOL/L (ref 96–106)
CHOLEST SERPL-MCNC: 151 MG/DL (ref 100–199)
CO2 SERPL-SCNC: 24 MMOL/L (ref 20–29)
CREAT SERPL-MCNC: 0.76 MG/DL (ref 0.57–1)
EGFRCR SERPLBLD CKD-EPI 2021: 98 ML/MIN/1.73
ERYTHROCYTE [DISTWIDTH] IN BLOOD BY AUTOMATED COUNT: 11.3 % (ref 11.7–15.4)
GLOBULIN SER CALC-MCNC: 2.8 G/DL (ref 1.5–4.5)
GLUCOSE SERPL-MCNC: 154 MG/DL (ref 70–99)
HBA1C MFR BLD: 7.1 % (ref 4.8–5.6)
HCT VFR BLD AUTO: 42.5 % (ref 34–46.6)
HDLC SERPL-MCNC: 40 MG/DL
HGB BLD-MCNC: 14.2 G/DL (ref 11.1–15.9)
LDLC SERPL CALC-MCNC: 98 MG/DL (ref 0–99)
MCH RBC QN AUTO: 30.8 PG (ref 26.6–33)
MCHC RBC AUTO-ENTMCNC: 33.4 G/DL (ref 31.5–35.7)
MCV RBC AUTO: 92 FL (ref 79–97)
PLATELET # BLD AUTO: 273 X10E3/UL (ref 150–450)
POTASSIUM SERPL-SCNC: 4.8 MMOL/L (ref 3.5–5.2)
PROT SERPL-MCNC: 7.3 G/DL (ref 6–8.5)
RBC # BLD AUTO: 4.61 X10E6/UL (ref 3.77–5.28)
SODIUM SERPL-SCNC: 140 MMOL/L (ref 134–144)
TRIGL SERPL-MCNC: 63 MG/DL (ref 0–149)
VLDLC SERPL CALC-MCNC: 13 MG/DL (ref 5–40)
WBC # BLD AUTO: 6.1 X10E3/UL (ref 3.4–10.8)

## 2024-01-09 ENCOUNTER — OFFICE VISIT (OUTPATIENT)
Dept: PRIMARY CARE CLINIC | Facility: CLINIC | Age: 49
End: 2024-01-09
Payer: COMMERCIAL

## 2024-01-09 VITALS
HEART RATE: 65 BPM | DIASTOLIC BLOOD PRESSURE: 89 MMHG | WEIGHT: 268.2 LBS | TEMPERATURE: 98.1 F | SYSTOLIC BLOOD PRESSURE: 122 MMHG | BODY MASS INDEX: 36.33 KG/M2 | OXYGEN SATURATION: 98 % | HEIGHT: 72 IN

## 2024-01-09 DIAGNOSIS — R05.1 ACUTE COUGH: Primary | ICD-10-CM

## 2024-01-09 PROCEDURE — 3079F DIAST BP 80-89 MM HG: CPT | Performed by: NURSE PRACTITIONER

## 2024-01-09 PROCEDURE — 3074F SYST BP LT 130 MM HG: CPT | Performed by: NURSE PRACTITIONER

## 2024-01-09 PROCEDURE — 99213 OFFICE O/P EST LOW 20 MIN: CPT | Performed by: NURSE PRACTITIONER

## 2024-01-09 RX ORDER — ALBUTEROL SULFATE 90 UG/1
AEROSOL, METERED RESPIRATORY (INHALATION)
COMMUNITY
Start: 2023-10-29

## 2024-01-09 RX ORDER — BENZONATATE 100 MG/1
100 CAPSULE ORAL 3 TIMES DAILY PRN
Qty: 30 CAPSULE | Refills: 0 | Status: SHIPPED | OUTPATIENT
Start: 2024-01-09 | End: 2024-01-19

## 2024-01-09 RX ORDER — FLUTICASONE PROPIONATE 110 UG/1
2 AEROSOL, METERED RESPIRATORY (INHALATION) 2 TIMES DAILY
Qty: 12 G | Refills: 3 | Status: SHIPPED | OUTPATIENT
Start: 2024-01-09 | End: 2024-01-10

## 2024-01-09 SDOH — ECONOMIC STABILITY: INCOME INSECURITY: HOW HARD IS IT FOR YOU TO PAY FOR THE VERY BASICS LIKE FOOD, HOUSING, MEDICAL CARE, AND HEATING?: NOT HARD AT ALL

## 2024-01-09 SDOH — ECONOMIC STABILITY: FOOD INSECURITY: WITHIN THE PAST 12 MONTHS, YOU WORRIED THAT YOUR FOOD WOULD RUN OUT BEFORE YOU GOT MONEY TO BUY MORE.: NEVER TRUE

## 2024-01-09 SDOH — ECONOMIC STABILITY: FOOD INSECURITY: WITHIN THE PAST 12 MONTHS, THE FOOD YOU BOUGHT JUST DIDN'T LAST AND YOU DIDN'T HAVE MONEY TO GET MORE.: NEVER TRUE

## 2024-01-09 ASSESSMENT — ENCOUNTER SYMPTOMS
COUGH: 1
STRIDOR: 0
GASTROINTESTINAL NEGATIVE: 1
SHORTNESS OF BREATH: 0

## 2024-01-09 ASSESSMENT — PATIENT HEALTH QUESTIONNAIRE - PHQ9
SUM OF ALL RESPONSES TO PHQ QUESTIONS 1-9: 0
SUM OF ALL RESPONSES TO PHQ QUESTIONS 1-9: 0
1. LITTLE INTEREST OR PLEASURE IN DOING THINGS: 0
SUM OF ALL RESPONSES TO PHQ QUESTIONS 1-9: 0
SUM OF ALL RESPONSES TO PHQ QUESTIONS 1-9: 0
2. FEELING DOWN, DEPRESSED OR HOPELESS: 0
SUM OF ALL RESPONSES TO PHQ9 QUESTIONS 1 & 2: 0

## 2024-01-09 NOTE — PROGRESS NOTES
Frank Macias is a 48 y.o. female presents for    Chief Complaint   Patient presents with    Other     November had URI  and also had covid in December. Now she is still having bad cough. Coughs so bad that it feels like something is in chest last night couldn't breathe all the way.     .    ASSESSMENT and PLAN  Frank was seen today for other.    Diagnoses and all orders for this visit:    Acute cough  Comments:  check xray  tx with flovent. Discussed OTC medication for chest congestion/symptoms and to increase fluids.  Red flag sx reviewed.  Orders:  -     XR CHEST (2 VIEWS)  -     fluticasone (FLOVENT HFA) 110 MCG/ACT inhaler; Inhale 2 puffs into the lungs 2 times daily  -     benzonatate (TESSALON) 100 MG capsule; Take 1 capsule by mouth 3 times daily as needed for Cough             HISTORY OF PRESENT ILLNESS  Frank Macias is a 48 y.o. female presents for    Chief Complaint   Patient presents with    Other     November had URI  and also had covid in December. Now she is still having bad cough. Coughs so bad that it feels like something is in chest last night couldn't breathe all the way.     .    Patient diagnosed with URI in Nov. Given breathing treatments.  She had COVID in the end of Dec.  Feels like she has had a chronic cough since sick with the URI in Nov. States nothing comes up when she is coughing but can \"feel it in her chest\". Denies wheezing or trouble breathing. Has been using albuterol inhaler with minimal relief.  It is worse when she is laying flat at night  Has tried mucinex which helps minimally, usually only at night. Also tried robitussin.   Denies fevers. Denies ST, ear pain, congestion.     Vitals:    01/09/24 1127   BP: 122/89   Site: Left Upper Arm   Position: Sitting   Cuff Size: Large Adult   Pulse: 65   Temp: 98.1 °F (36.7 °C)   TempSrc: Oral   SpO2: 98%   Weight: 121.7 kg (268 lb 3.2 oz)   Height: 1.854 m (6' 1\")     Patient Active Problem List   Diagnosis    Anxiety

## 2024-01-09 NOTE — PROGRESS NOTES
Identified Patient with two Patient identifiers(name and ).     1. Have you been to the ER, urgent care clinic since your last visit?  Hospitalized since your last visit?Yes pt firsst december    2. Have you seen or consulted any other health care providers outside of the [facility] since your last visit?   No     3. For patients aged 45-75: Has the patient had a colonoscopy / FIT/ Cologuard? NA - based on age/sex    If the patient is female:    4. For patients aged 40-74: Has the patient had a mammogram within the past 2 years?  NA - based on age/sex scheduled      5. For patients aged 21-65: Has the patient had a pap smear?  NA - based on age/sex   There were no vitals taken for this visit.    Chief Complaint   Patient presents with    Other     November had URI  and also had covid in December. Now she is still having bad cough. Coughs so bad that it feels like something is in chest last night couldn't breathe all the way.        Health Maintenance Due   Topic Date Due    Hepatitis B vaccine (1 of 3 - 3-dose series) Never done    Pneumococcal 0-64 years Vaccine (1 - PCV) Never done    Diabetic foot exam  Never done    HIV screen  Never done    Diabetic retinal exam  Never done    Hepatitis C screen  Never done    DTaP/Tdap/Td vaccine (1 - Tdap) Never done    Cervical cancer screen  Never done    Colorectal Cancer Screen  Never done    Flu vaccine (1) Never done    COVID-19 Vaccine (3 - - season) 2023    Diabetic Alb to Cr ratio (uACR) test  2023          No questionnaires available.

## 2024-01-10 DIAGNOSIS — R05.1 ACUTE COUGH: Primary | ICD-10-CM

## 2024-01-15 RX ORDER — ALBUTEROL SULFATE 90 UG/1
AEROSOL, METERED RESPIRATORY (INHALATION)
Qty: 18 G | Refills: 1 | Status: SHIPPED | OUTPATIENT
Start: 2024-01-15

## 2024-01-24 DIAGNOSIS — R05.1 ACUTE COUGH: Primary | ICD-10-CM

## 2024-01-24 RX ORDER — BENZONATATE 100 MG/1
100 CAPSULE ORAL 3 TIMES DAILY PRN
Qty: 30 CAPSULE | Refills: 0 | Status: SHIPPED | OUTPATIENT
Start: 2024-01-24 | End: 2024-02-03

## 2024-04-04 DIAGNOSIS — E78.2 MIXED HYPERLIPIDEMIA: ICD-10-CM

## 2024-04-04 RX ORDER — ROSUVASTATIN CALCIUM 10 MG/1
10 TABLET, COATED ORAL NIGHTLY
Qty: 90 TABLET | Refills: 1 | Status: SHIPPED | OUTPATIENT
Start: 2024-04-04

## 2024-04-05 DIAGNOSIS — I10 ESSENTIAL (PRIMARY) HYPERTENSION: ICD-10-CM

## 2024-04-05 RX ORDER — HYDROCHLOROTHIAZIDE 25 MG/1
TABLET ORAL
Qty: 90 TABLET | Refills: 1 | Status: SHIPPED | OUTPATIENT
Start: 2024-04-05

## 2024-04-17 ENCOUNTER — TELEMEDICINE (OUTPATIENT)
Dept: PRIMARY CARE CLINIC | Facility: CLINIC | Age: 49
End: 2024-04-17
Payer: COMMERCIAL

## 2024-04-17 DIAGNOSIS — E11.9 TYPE 2 DIABETES MELLITUS WITHOUT COMPLICATION, WITHOUT LONG-TERM CURRENT USE OF INSULIN (HCC): ICD-10-CM

## 2024-04-17 DIAGNOSIS — E78.2 MIXED HYPERLIPIDEMIA: ICD-10-CM

## 2024-04-17 DIAGNOSIS — I10 ESSENTIAL (PRIMARY) HYPERTENSION: Primary | ICD-10-CM

## 2024-04-17 PROCEDURE — 99214 OFFICE O/P EST MOD 30 MIN: CPT | Performed by: NURSE PRACTITIONER

## 2024-04-17 RX ORDER — VALSARTAN 160 MG/1
160 TABLET ORAL DAILY
Qty: 90 TABLET | Refills: 1 | Status: SHIPPED | OUTPATIENT
Start: 2024-04-17

## 2024-04-17 ASSESSMENT — ENCOUNTER SYMPTOMS
ABDOMINAL PAIN: 0
NAUSEA: 1
SHORTNESS OF BREATH: 0

## 2024-04-17 ASSESSMENT — PATIENT HEALTH QUESTIONNAIRE - PHQ9
SUM OF ALL RESPONSES TO PHQ9 QUESTIONS 1 & 2: 0
2. FEELING DOWN, DEPRESSED OR HOPELESS: NOT AT ALL
SUM OF ALL RESPONSES TO PHQ QUESTIONS 1-9: 0
1. LITTLE INTEREST OR PLEASURE IN DOING THINGS: NOT AT ALL

## 2024-04-17 NOTE — PROGRESS NOTES
\"Have you been to the ER, urgent care clinic since your last visit?  Hospitalized since your last visit?\"    [facility]    “Have you seen or consulted any other health care providers outside of [facility] since your last visit?”    NO     “Have you had a pap smear?”    NO    No cervical cancer screening on file         “Have you had a colorectal cancer screening such as a colonoscopy/FIT/Cologuard?    NO    No colonoscopy on file  No cologuard on file  No FIT/FOBT on file   No flexible sigmoidoscopy on file         Click Here for Release of Records Request  
daily 90 tablet 1    metFORMIN (GLUCOPHAGE-XR) 500 MG extended release tablet Take 4 tablets by mouth Daily with supper 360 tablet 1     No current facility-administered medications for this visit.       Reviewed and updated this visit:  Tobacco  Allergies  Meds  Problems  Med Hx  Surg Hx  Soc Hx  Fam Hx     Sheona B Lorin Macias, was evaluated through a synchronous (real-time) audio-video encounter. The patient (or guardian if applicable) is aware that this is a billable service, which includes applicable co-pays. This Virtual Visit was conducted with patient's (and/or legal guardian's) consent. Patient identification was verified, and a caregiver was present when appropriate.   The patient was located at Home: [address] [place] [address]  Provider was located at Facility (Appt Dept): [address] [place] [phone]  Confirm you are appropriately licensed, registered, or certified to deliver care in the state where the patient is located as indicated above. If you are not or unsure, please re-schedule the visit: Yes, I confirm.        Services were provided through a video synchronous discussion virtually to substitute for in-person encounter.    --Denisa Reed, APRN - CNP on 4/17/2024 at 3:57 PM    An electronic signature was used to authenticate this note.

## 2024-04-20 ENCOUNTER — PATIENT MESSAGE (OUTPATIENT)
Dept: PRIMARY CARE CLINIC | Facility: CLINIC | Age: 49
End: 2024-04-20

## 2024-04-20 DIAGNOSIS — E11.9 TYPE 2 DIABETES MELLITUS WITHOUT COMPLICATION, WITHOUT LONG-TERM CURRENT USE OF INSULIN (HCC): Primary | ICD-10-CM

## 2024-04-20 LAB
ALBUMIN SERPL-MCNC: 4.3 G/DL (ref 3.9–4.9)
ALBUMIN/GLOB SERPL: 1.6 {RATIO} (ref 1.2–2.2)
ALP SERPL-CCNC: 94 IU/L (ref 44–121)
ALT SERPL-CCNC: 10 IU/L (ref 0–32)
AST SERPL-CCNC: 10 IU/L (ref 0–40)
BILIRUB SERPL-MCNC: 0.6 MG/DL (ref 0–1.2)
BUN SERPL-MCNC: 11 MG/DL (ref 6–24)
BUN/CREAT SERPL: 15 (ref 9–23)
CALCIUM SERPL-MCNC: 9.8 MG/DL (ref 8.7–10.2)
CHLORIDE SERPL-SCNC: 100 MMOL/L (ref 96–106)
CHOLEST SERPL-MCNC: 165 MG/DL (ref 100–199)
CO2 SERPL-SCNC: 24 MMOL/L (ref 20–29)
CREAT SERPL-MCNC: 0.72 MG/DL (ref 0.57–1)
EGFRCR SERPLBLD CKD-EPI 2021: 104 ML/MIN/1.73
ERYTHROCYTE [DISTWIDTH] IN BLOOD BY AUTOMATED COUNT: 11.5 % (ref 11.7–15.4)
GLOBULIN SER CALC-MCNC: 2.7 G/DL (ref 1.5–4.5)
GLUCOSE SERPL-MCNC: 224 MG/DL (ref 70–99)
HBA1C MFR BLD: 8.1 % (ref 4.8–5.6)
HCT VFR BLD AUTO: 42.4 % (ref 34–46.6)
HDLC SERPL-MCNC: 37 MG/DL
HGB BLD-MCNC: 13.8 G/DL (ref 11.1–15.9)
LDLC SERPL CALC-MCNC: 106 MG/DL (ref 0–99)
MCH RBC QN AUTO: 30 PG (ref 26.6–33)
MCHC RBC AUTO-ENTMCNC: 32.5 G/DL (ref 31.5–35.7)
MCV RBC AUTO: 92 FL (ref 79–97)
PLATELET # BLD AUTO: 352 X10E3/UL (ref 150–450)
POTASSIUM SERPL-SCNC: 4.5 MMOL/L (ref 3.5–5.2)
PROT SERPL-MCNC: 7 G/DL (ref 6–8.5)
RBC # BLD AUTO: 4.6 X10E6/UL (ref 3.77–5.28)
SODIUM SERPL-SCNC: 139 MMOL/L (ref 134–144)
TRIGL SERPL-MCNC: 124 MG/DL (ref 0–149)
VLDLC SERPL CALC-MCNC: 22 MG/DL (ref 5–40)
WBC # BLD AUTO: 6.1 X10E3/UL (ref 3.4–10.8)

## 2024-04-23 RX ORDER — DULAGLUTIDE 0.75 MG/.5ML
0.75 INJECTION, SOLUTION SUBCUTANEOUS WEEKLY
Qty: 2 ML | Refills: 1 | Status: SHIPPED | OUTPATIENT
Start: 2024-04-23 | End: 2024-04-23 | Stop reason: SDUPTHER

## 2024-04-23 RX ORDER — DULAGLUTIDE 0.75 MG/.5ML
0.75 INJECTION, SOLUTION SUBCUTANEOUS WEEKLY
Qty: 2 ML | Refills: 1 | Status: SHIPPED | OUTPATIENT
Start: 2024-04-23

## 2024-04-23 NOTE — TELEPHONE ENCOUNTER
From: Frank Macias  To: Denisa Reed  Sent: 4/20/2024 5:53 AM EDT  Subject: Results     Good Morning! I saw the results. I would like to change to something else or add something. Can I start something to help with weight loss if possible? I have pcos too so I think the metformin helps with that.

## 2024-05-11 ENCOUNTER — PATIENT MESSAGE (OUTPATIENT)
Dept: PRIMARY CARE CLINIC | Facility: CLINIC | Age: 49
End: 2024-05-11

## 2024-05-11 DIAGNOSIS — E11.9 TYPE 2 DIABETES MELLITUS WITHOUT COMPLICATION, WITHOUT LONG-TERM CURRENT USE OF INSULIN (HCC): Primary | ICD-10-CM

## 2024-06-25 NOTE — TELEPHONE ENCOUNTER
From: Frank Macias  To: Denisa Reed  Sent: 5/11/2024 12:08 PM EDT  Subject: Trulicity     Good Afternoon! I chose Trulicity. I started it today. Just letting you know. I was fine. It did make me tired. Other than that I feel fine.    Thanks!

## 2024-07-02 RX ORDER — DULAGLUTIDE 0.75 MG/.5ML
INJECTION, SOLUTION SUBCUTANEOUS
Refills: 1 | OUTPATIENT
Start: 2024-07-02

## 2024-07-05 DIAGNOSIS — E11.9 TYPE 2 DIABETES MELLITUS WITHOUT COMPLICATION, WITHOUT LONG-TERM CURRENT USE OF INSULIN (HCC): ICD-10-CM

## 2024-07-08 RX ORDER — METFORMIN HYDROCHLORIDE 500 MG/1
2000 TABLET, EXTENDED RELEASE ORAL
Qty: 360 TABLET | Refills: 1 | Status: SHIPPED | OUTPATIENT
Start: 2024-07-08

## 2024-07-14 DIAGNOSIS — E11.9 TYPE 2 DIABETES MELLITUS WITHOUT COMPLICATION, WITHOUT LONG-TERM CURRENT USE OF INSULIN (HCC): ICD-10-CM

## 2024-07-15 RX ORDER — DAPAGLIFLOZIN 10 MG/1
10 TABLET, FILM COATED ORAL DAILY
Qty: 90 TABLET | Refills: 1 | Status: SHIPPED | OUTPATIENT
Start: 2024-07-15

## 2024-07-24 ENCOUNTER — OFFICE VISIT (OUTPATIENT)
Dept: PRIMARY CARE CLINIC | Facility: CLINIC | Age: 49
End: 2024-07-24
Payer: COMMERCIAL

## 2024-07-24 VITALS
BODY MASS INDEX: 34.78 KG/M2 | HEIGHT: 72 IN | DIASTOLIC BLOOD PRESSURE: 88 MMHG | HEART RATE: 70 BPM | SYSTOLIC BLOOD PRESSURE: 138 MMHG | WEIGHT: 256.8 LBS | TEMPERATURE: 97.9 F

## 2024-07-24 DIAGNOSIS — M25.511 ACUTE PAIN OF BOTH SHOULDERS: ICD-10-CM

## 2024-07-24 DIAGNOSIS — E78.2 MIXED HYPERLIPIDEMIA: ICD-10-CM

## 2024-07-24 DIAGNOSIS — M25.512 ACUTE PAIN OF BOTH SHOULDERS: ICD-10-CM

## 2024-07-24 DIAGNOSIS — E11.9 TYPE 2 DIABETES MELLITUS WITHOUT COMPLICATION, WITHOUT LONG-TERM CURRENT USE OF INSULIN (HCC): Primary | ICD-10-CM

## 2024-07-24 DIAGNOSIS — I10 ESSENTIAL (PRIMARY) HYPERTENSION: ICD-10-CM

## 2024-07-24 PROCEDURE — 3052F HG A1C>EQUAL 8.0%<EQUAL 9.0%: CPT | Performed by: NURSE PRACTITIONER

## 2024-07-24 PROCEDURE — 3075F SYST BP GE 130 - 139MM HG: CPT | Performed by: NURSE PRACTITIONER

## 2024-07-24 PROCEDURE — 99214 OFFICE O/P EST MOD 30 MIN: CPT | Performed by: NURSE PRACTITIONER

## 2024-07-24 PROCEDURE — 3079F DIAST BP 80-89 MM HG: CPT | Performed by: NURSE PRACTITIONER

## 2024-07-24 RX ORDER — NAPROXEN 375 MG/1
375 TABLET ORAL 2 TIMES DAILY WITH MEALS
Qty: 60 TABLET | Refills: 0 | Status: SHIPPED | OUTPATIENT
Start: 2024-07-24

## 2024-07-24 RX ORDER — PREDNISONE 20 MG/1
40 TABLET ORAL DAILY
Qty: 10 TABLET | Refills: 0 | Status: SHIPPED | OUTPATIENT
Start: 2024-07-24 | End: 2024-07-29

## 2024-07-24 ASSESSMENT — ENCOUNTER SYMPTOMS: SHORTNESS OF BREATH: 0

## 2024-07-24 NOTE — PROGRESS NOTES
\"Have you been to the ER, urgent care clinic since your last visit?  Hospitalized since your last visit?\"    NO    “Have you seen or consulted any other health care providers outside of [facility] since your last visit?”    NO    Have you had a mammogram?”   NO    No breast cancer screening on file      “Have you had a pap smear?”    NO next month    No cervical cancer screening on file         “Have you had a colorectal cancer screening such as a colonoscopy/FIT/Cologuard?    NO    No colonoscopy on file  No cologuard on file  No FIT/FOBT on file   No flexible sigmoidoscopy on file         Click Here for Release of Records Request

## 2024-07-24 NOTE — PROGRESS NOTES
Frank Macias is a 49 y.o. female presents for    Chief Complaint   Patient presents with    Pain     Left top shoulder is painful patient can't lift arm up too much. Has take ibuprofen and used heating pads. Now other shoulder is starting to hurt as well    .    ASSESSMENT and PLAN  Frank was seen today for pain.    Diagnoses and all orders for this visit:    Type 2 diabetes mellitus without complication, without long-term current use of insulin ([facility])  Comments:  tolerating trulicity; will recheck a1c and adjust dose as needed.  Orders:  -     CBC  -     Comprehensive Metabolic Panel  -     Hemoglobin A1C  -     Lipid Panel    Essential (primary) hypertension  Comments:  BP controlled.    Mixed hyperlipidemia  Comments:  recheck labs    Acute pain of both shoulders  Comments:  Will check xray. treat with short course steroids, naproxen, and continue heat/ice. discussed ROM exercises. INI, will refer to PT.  Orders:  -     XR SHOULDER LEFT (MIN 2 VIEWS)  -     XR SHOULDER RIGHT (MIN 2 VIEWS)  -     predniSONE (DELTASONE) 20 MG tablet; Take 2 tablets by mouth daily for 5 days  -     naproxen (NAPROSYN) 375 MG tablet; Take 1 tablet by mouth 2 times daily (with meals)             HISTORY OF PRESENT ILLNESS  Frank Macias is a 49 y.o. female presents for    Chief Complaint   Patient presents with    Pain     Left top shoulder is painful patient can't lift arm up too much. Has take ibuprofen and used heating pads. Now other shoulder is starting to hurt as well    .    Patient reports bilateral shoulder pain x 1.5 weeks. She reports limited ROM and the pain is the worst when raising it over her head. Ice made it worse. She has been using heat which helps a little bit and ibuprofen. Pain has improved a little bit over the last few days    Type 2 DM: she has been tolerating the trulicity well. Still on metformin too. She does not check blood sugars at home. She denies neuropathy.   Hemoglobin A1C   Date

## 2024-07-25 LAB
ALBUMIN SERPL-MCNC: 4.6 G/DL (ref 3.9–4.9)
ALP SERPL-CCNC: 101 IU/L (ref 44–121)
ALT SERPL-CCNC: 13 IU/L (ref 0–32)
AST SERPL-CCNC: 13 IU/L (ref 0–40)
BILIRUB SERPL-MCNC: 0.6 MG/DL (ref 0–1.2)
BUN SERPL-MCNC: 12 MG/DL (ref 6–24)
BUN/CREAT SERPL: 19 (ref 9–23)
CALCIUM SERPL-MCNC: 10.1 MG/DL (ref 8.7–10.2)
CHLORIDE SERPL-SCNC: 99 MMOL/L (ref 96–106)
CHOLEST SERPL-MCNC: 112 MG/DL (ref 100–199)
CO2 SERPL-SCNC: 26 MMOL/L (ref 20–29)
CREAT SERPL-MCNC: 0.64 MG/DL (ref 0.57–1)
EGFRCR SERPLBLD CKD-EPI 2021: 110 ML/MIN/1.73
ERYTHROCYTE [DISTWIDTH] IN BLOOD BY AUTOMATED COUNT: 11.6 % (ref 11.7–15.4)
GLOBULIN SER CALC-MCNC: 2.7 G/DL (ref 1.5–4.5)
GLUCOSE SERPL-MCNC: 125 MG/DL (ref 70–99)
HBA1C MFR BLD: 6.4 % (ref 4.8–5.6)
HCT VFR BLD AUTO: 41.8 % (ref 34–46.6)
HDLC SERPL-MCNC: 36 MG/DL
HGB BLD-MCNC: 13.7 G/DL (ref 11.1–15.9)
LDLC SERPL CALC-MCNC: 60 MG/DL (ref 0–99)
MCH RBC QN AUTO: 30.6 PG (ref 26.6–33)
MCHC RBC AUTO-ENTMCNC: 32.8 G/DL (ref 31.5–35.7)
MCV RBC AUTO: 94 FL (ref 79–97)
PLATELET # BLD AUTO: 286 X10E3/UL (ref 150–450)
POTASSIUM SERPL-SCNC: 4.6 MMOL/L (ref 3.5–5.2)
PROT SERPL-MCNC: 7.3 G/DL (ref 6–8.5)
RBC # BLD AUTO: 4.47 X10E6/UL (ref 3.77–5.28)
SODIUM SERPL-SCNC: 140 MMOL/L (ref 134–144)
TRIGL SERPL-MCNC: 78 MG/DL (ref 0–149)
VLDLC SERPL CALC-MCNC: 16 MG/DL (ref 5–40)
WBC # BLD AUTO: 7.2 X10E3/UL (ref 3.4–10.8)

## 2024-07-26 ENCOUNTER — HOSPITAL ENCOUNTER (OUTPATIENT)
Facility: HOSPITAL | Age: 49
Discharge: HOME | End: 2024-07-26
Payer: COMMERCIAL

## 2024-07-26 DIAGNOSIS — I10 ESSENTIAL (PRIMARY) HYPERTENSION: ICD-10-CM

## 2024-07-26 DIAGNOSIS — E11.9 TYPE 2 DIABETES MELLITUS WITHOUT COMPLICATION, WITHOUT LONG-TERM CURRENT USE OF INSULIN (HCC): ICD-10-CM

## 2024-07-26 DIAGNOSIS — E78.2 MIXED HYPERLIPIDEMIA: ICD-10-CM

## 2024-07-26 PROCEDURE — 73030 X-RAY EXAM OF SHOULDER: CPT

## 2024-07-28 ENCOUNTER — PATIENT MESSAGE (OUTPATIENT)
Dept: PRIMARY CARE CLINIC | Facility: CLINIC | Age: 49
End: 2024-07-28

## 2024-07-28 DIAGNOSIS — E11.9 TYPE 2 DIABETES MELLITUS WITHOUT COMPLICATION, WITHOUT LONG-TERM CURRENT USE OF INSULIN (HCC): Primary | ICD-10-CM

## 2024-07-28 RX ORDER — HYDROCHLOROTHIAZIDE 25 MG/1
25 TABLET ORAL DAILY
Qty: 90 TABLET | Refills: 1 | Status: SHIPPED | OUTPATIENT
Start: 2024-07-28

## 2024-07-28 RX ORDER — ROSUVASTATIN CALCIUM 10 MG/1
10 TABLET, COATED ORAL NIGHTLY
Qty: 90 TABLET | Refills: 1 | Status: SHIPPED | OUTPATIENT
Start: 2024-07-28

## 2024-07-28 RX ORDER — VALSARTAN 160 MG/1
160 TABLET ORAL DAILY
Qty: 90 TABLET | Refills: 1 | Status: SHIPPED | OUTPATIENT
Start: 2024-07-28

## 2024-07-28 RX ORDER — DAPAGLIFLOZIN 10 MG/1
10 TABLET, FILM COATED ORAL DAILY
Qty: 90 TABLET | Refills: 1 | Status: SHIPPED | OUTPATIENT
Start: 2024-07-28

## 2024-07-28 RX ORDER — METFORMIN HYDROCHLORIDE 500 MG/1
2000 TABLET, EXTENDED RELEASE ORAL
Qty: 360 TABLET | Refills: 1 | Status: SHIPPED | OUTPATIENT
Start: 2024-07-28

## 2024-07-28 RX ORDER — NEBIVOLOL 20 MG/1
1 TABLET ORAL DAILY
Qty: 90 TABLET | Refills: 1 | Status: SHIPPED | OUTPATIENT
Start: 2024-07-28

## 2024-07-29 NOTE — TELEPHONE ENCOUNTER
From: Frank Macias  To: Denisa Reed  Sent: 7/28/2024 12:41 PM EDT  Subject: Trulicity    Good Afternoon! Do you want to increase trulicity? I’m currently on 1.5 mg. I’m fine if you want to.

## 2024-08-16 ENCOUNTER — COMMUNITY OUTREACH (OUTPATIENT)
Dept: PRIMARY CARE CLINIC | Facility: CLINIC | Age: 49
End: 2024-08-16

## 2024-08-21 DIAGNOSIS — M25.511 ACUTE PAIN OF BOTH SHOULDERS: ICD-10-CM

## 2024-08-21 DIAGNOSIS — M25.512 ACUTE PAIN OF BOTH SHOULDERS: ICD-10-CM

## 2024-08-21 RX ORDER — NAPROXEN 375 MG/1
375 TABLET ORAL 2 TIMES DAILY WITH MEALS
Qty: 60 TABLET | Refills: 0 | Status: SHIPPED | OUTPATIENT
Start: 2024-08-21

## 2024-08-25 ENCOUNTER — HOSPITAL ENCOUNTER (EMERGENCY)
Facility: HOSPITAL | Age: 49
Discharge: HOME | End: 2024-08-25
Attending: FAMILY MEDICINE
Payer: COMMERCIAL

## 2024-08-25 VITALS
SYSTOLIC BLOOD PRESSURE: 164 MMHG | DIASTOLIC BLOOD PRESSURE: 90 MMHG | BODY MASS INDEX: 34.54 KG/M2 | RESPIRATION RATE: 16 BRPM | OXYGEN SATURATION: 100 % | WEIGHT: 255 LBS | TEMPERATURE: 98.2 F | HEIGHT: 72 IN | HEART RATE: 83 BPM

## 2024-08-25 DIAGNOSIS — R09.81 NASAL CONGESTION: Primary | ICD-10-CM

## 2024-08-25 PROCEDURE — 99283 EMERGENCY DEPT VISIT LOW MDM: CPT

## 2024-08-25 RX ORDER — PREDNISONE 20 MG/1
40 TABLET ORAL DAILY
Qty: 10 TABLET | Refills: 0 | Status: SHIPPED | OUTPATIENT
Start: 2024-08-25 | End: 2024-08-30

## 2024-08-25 ASSESSMENT — PAIN - FUNCTIONAL ASSESSMENT: PAIN_FUNCTIONAL_ASSESSMENT: 0-10

## 2024-08-25 NOTE — ED PROVIDER NOTES
EMERGENCY DEPARTMENT HISTORY AND PHYSICAL EXAM      Date: (Not on file)  Patient Name: Frank Macias    History of Presenting Illness     Chief Complaint   Patient presents with    Nasal Congestion       HPI: Frank Macias, is a very pleasant 49 y.o. female presenting to the ED with a CC of nasal congestion, body aches.  Recent onset of symptoms.  No fevers nor changes in oral intake.  No difficulty breathing.  No alleviating or aggravating factors.  Positive COVID test this morning.        Past History     Past Medical History:  Past Medical History:   Diagnosis Date    Anemia     Anxiety     Diabetes (HCC)     Hypercholesterolemia     Hypertension     Polycystic ovaries        Allergies:  Allergies   Allergen Reactions    Linagliptin Other (See Comments)     Upset stomach        Review of Systems     Negative unless otherwise stated by HPI   Physical Exam   There were no vitals filed for this visit.  CONSTITUTIONAL: Alert, in no distress. Appears stated age.,  Nontoxic, well-appearing  HEAD:  Normocephalic, atraumatic  EARS: Bilateral external auditory canals nonerythematous, bilateral tympanic membranes nonbulging and nonerythematous  EYES: EOM intact.  No conjunctival injection or scleral icterus  MOUTH: Posterior oropharynx nonerythematous, no swelling, uvula midline, tolerating secretions with ease  Neck:  Supple. No meningismus,  RESP: No increased work of breathing, lungs clear to auscultation bilaterally.  No wheezes rales nor rhonchi.  No accessory muscle use  CV: Heart is regular rate and rhythm, no murmurs, no JVD, capillary refill less than 2 seconds  NEURO: Moves all extremities spontaneously.  No motor nor sensory deficits.  No focal neurologic deficits.  No neck stiffness nor pain with flexion and extension  PSYCH: Normal mood, normal affect      Medical Decision Making     RADIOLOGY:  Interpretation per the Radiologist below, if available at the time of this note:  No orders to  inhaler  Commonly known as: PROVENTIL;VENTOLIN;PROAIR  INHALE 2 PUFFS EVERY 4 HOURS AS NEEDED FOR COUGH/WHEEZING     dapagliflozin 10 MG tablet  Commonly known as: Farxiga  Take 1 tablet by mouth daily     Dulaglutide 3 MG/0.5ML Sopn  Inject 3 mg into the skin once a week     hydroCHLOROthiazide 25 MG tablet  Commonly known as: HYDRODIURIL  Take 1 tablet by mouth daily     metFORMIN 500 MG extended release tablet  Commonly known as: GLUCOPHAGE-XR  Take 4 tablets by mouth Daily with supper     naproxen 375 MG tablet  Commonly known as: NAPROSYN  TAKE 1 TABLET BY MOUTH TWICE A DAY WITH MEALS     nebivolol 20 MG Tabs tablet  Commonly known as: BYSTOLIC  Take 1 tablet by mouth daily     rosuvastatin 10 MG tablet  Commonly known as: CRESTOR  Take 1 tablet by mouth nightly     valsartan 160 MG tablet  Commonly known as: DIOVAN  Take 1 tablet by mouth daily               Where to Get Your Medications        These medications were sent to [facility]/pharm[facility] #[id number] - [place], [place] - [address] - P [phone] - F [phone]  [address] [facility] [address]      Phone: [phone]   predniSONE 20 MG tablet       2. Denisa Reed, APRN - CNP  [address] [facility] [address] [phone]          3. Take Tylenol as needed  4. Drink plenty of fluids  5. Return to ED if worse especially if any shortness of breath, chest pain or altered mentation.    Diagnosis     Clinical Impression:   1. Nasal congestion        Please note that this dictation was completed with [employer], the [employer] voice recognition software. Quite often unanticipated grammatical, syntax, homophones, and other interpretive errors are inadvertently transcribed by the computer software. Please disregards these errors. Please excuse any errors that have escaped final proofreading.        Kameron Echols, DO  08/25/24 3877

## 2024-08-27 ENCOUNTER — TELEMEDICINE (OUTPATIENT)
Dept: PRIMARY CARE CLINIC | Facility: CLINIC | Age: 49
End: 2024-08-27
Payer: COMMERCIAL

## 2024-08-27 DIAGNOSIS — U07.1 COVID: Primary | ICD-10-CM

## 2024-08-27 PROCEDURE — 99213 OFFICE O/P EST LOW 20 MIN: CPT | Performed by: NURSE PRACTITIONER

## 2024-08-27 RX ORDER — DEXTROMETHORPHAN HYDROBROMIDE AND PROMETHAZINE HYDROCHLORIDE 15; 6.25 MG/5ML; MG/5ML
5 SYRUP ORAL 4 TIMES DAILY PRN
Qty: 150 ML | Refills: 0 | Status: SHIPPED | OUTPATIENT
Start: 2024-08-27 | End: 2024-09-03

## 2024-08-27 ASSESSMENT — ENCOUNTER SYMPTOMS
SORE THROAT: 0
COUGH: 1
WHEEZING: 1
SHORTNESS OF BREATH: 0

## 2024-08-27 NOTE — PROGRESS NOTES
\"Have you been to the ER, urgent care clinic since your last visit?  Hospitalized since your last visit?\"    Yes 08/25/2024 COVID    “Have you seen or consulted any other health care providers outside of [facility] since your last visit?”    NO    Have you had a mammogram?”   NO    No breast cancer screening on file      “Have you had a pap smear?”    NO    No cervical cancer screening on file         “Have you had a colorectal cancer screening such as a colonoscopy/FIT/Cologuard?    NO    No colonoscopy on file  No cologuard on file  No FIT/FOBT on file   No flexible sigmoidoscopy on file         Click Here for Release of Records Request

## 2024-08-27 NOTE — PROGRESS NOTES
normal.         Behavior: Behavior normal.          Allergies   Allergen Reactions    Linagliptin Other (See Comments)     Upset stomach        Current Outpatient Medications   Medication Sig Dispense Refill    promethazine-dextromethorphan (PROMETHAZINE-DM) 6.25-15 MG/5ML syrup Take 5 mLs by mouth 4 times daily as needed for Cough 150 mL 0    predniSONE (DELTASONE) 20 MG tablet Take 2 tablets by mouth daily for 5 days 10 tablet 0    naproxen (NAPROSYN) 375 MG tablet TAKE 1 TABLET BY MOUTH TWICE A DAY WITH MEALS 60 tablet 0    Dulaglutide 3 MG/0.5ML SOPN Inject 3 mg into the skin once a week 2 mL 1    rosuvastatin (CRESTOR) 10 MG tablet Take 1 tablet by mouth nightly 90 tablet 1    metFORMIN (GLUCOPHAGE-XR) 500 MG extended release tablet Take 4 tablets by mouth Daily with supper 360 tablet 1    nebivolol (BYSTOLIC) 20 MG TABS tablet Take 1 tablet by mouth daily 90 tablet 1    hydroCHLOROthiazide (HYDRODIURIL) 25 MG tablet Take 1 tablet by mouth daily 90 tablet 1    valsartan (DIOVAN) 160 MG tablet Take 1 tablet by mouth daily 90 tablet 1    dapagliflozin (FARXIGA) 10 MG tablet Take 1 tablet by mouth daily 90 tablet 1    albuterol sulfate HFA (PROVENTIL;VENTOLIN;PROAIR) 108 (90 Base) MCG/ACT inhaler INHALE 2 PUFFS EVERY 4 HOURS AS NEEDED FOR COUGH/WHEEZING 18 g 1     No current facility-administered medications for this visit.       Reviewed and updated this visit:  Tobacco  Allergies  Meds  Problems  Med Hx  Surg Hx  Soc Hx  Fam Hx     Sheona B Lorin Macias, was evaluated through a synchronous (real-time) audio-video encounter. The patient (or guardian if applicable) is aware that this is a billable service, which includes applicable co-pays. This Virtual Visit was conducted with patient's (and/or legal guardian's) consent. Patient identification was verified, and a caregiver was present when appropriate.   The patient was located at Home: [address] [place] [address]  Provider was located at Home  (Appt Dept State): [place]  Confirm you are appropriately licensed, registered, or certified to deliver care in the state where the patient is located as indicated above. If you are not or unsure, please re-schedule the visit: Yes, I confirm.        Services were provided through a video synchronous discussion virtually to substitute for in-person encounter.    --Denisa Reed, NANO - CNP on 8/27/2024 at 9:20 AM    An electronic signature was used to authenticate this note.

## 2024-09-22 DIAGNOSIS — E11.9 TYPE 2 DIABETES MELLITUS WITHOUT COMPLICATION, WITHOUT LONG-TERM CURRENT USE OF INSULIN (HCC): ICD-10-CM

## 2024-10-30 ENCOUNTER — PATIENT MESSAGE (OUTPATIENT)
Dept: PRIMARY CARE CLINIC | Facility: CLINIC | Age: 49
End: 2024-10-30

## 2024-10-30 DIAGNOSIS — M25.511 CHRONIC RIGHT SHOULDER PAIN: Primary | ICD-10-CM

## 2024-10-30 DIAGNOSIS — G89.29 CHRONIC RIGHT SHOULDER PAIN: Primary | ICD-10-CM

## 2024-11-08 ENCOUNTER — PATIENT MESSAGE (OUTPATIENT)
Dept: PRIMARY CARE CLINIC | Facility: CLINIC | Age: 49
End: 2024-11-08

## 2024-11-12 NOTE — TELEPHONE ENCOUNTER
Both metformin and Trulicity can cause abdominal pain; however metformin tends to cause diarrhea.  It will be interesting to see what your weight is, because if you been having difficulty eating due to the abdominal pain and constipation you may have lost quite a bit of weight.  That would contribute to your lack of energy.     Thank you for letting me know about your appointment tomorrow; you are scheduled with Fantasma Rodriguez.  I will hold off on adjusting her medication and let her address them as she is seeing you tomorrow.  I will forward this information to her.

## 2024-11-13 ENCOUNTER — OFFICE VISIT (OUTPATIENT)
Dept: PRIMARY CARE CLINIC | Facility: CLINIC | Age: 49
End: 2024-11-13
Payer: COMMERCIAL

## 2024-11-13 VITALS
DIASTOLIC BLOOD PRESSURE: 82 MMHG | RESPIRATION RATE: 16 BRPM | OXYGEN SATURATION: 99 % | WEIGHT: 247 LBS | HEART RATE: 78 BPM | TEMPERATURE: 98.4 F | SYSTOLIC BLOOD PRESSURE: 142 MMHG | HEIGHT: 72 IN | BODY MASS INDEX: 33.46 KG/M2

## 2024-11-13 DIAGNOSIS — E11.9 TYPE 2 DIABETES MELLITUS WITHOUT COMPLICATION, WITHOUT LONG-TERM CURRENT USE OF INSULIN (HCC): ICD-10-CM

## 2024-11-13 DIAGNOSIS — K59.03 DRUG-INDUCED CONSTIPATION: ICD-10-CM

## 2024-11-13 DIAGNOSIS — R10.9 LEFT FLANK PAIN: Primary | ICD-10-CM

## 2024-11-13 PROCEDURE — 3079F DIAST BP 80-89 MM HG: CPT | Performed by: NURSE PRACTITIONER

## 2024-11-13 PROCEDURE — 3077F SYST BP >= 140 MM HG: CPT | Performed by: NURSE PRACTITIONER

## 2024-11-13 PROCEDURE — 99214 OFFICE O/P EST MOD 30 MIN: CPT | Performed by: NURSE PRACTITIONER

## 2024-11-13 PROCEDURE — 3044F HG A1C LEVEL LT 7.0%: CPT | Performed by: NURSE PRACTITIONER

## 2024-11-13 SDOH — ECONOMIC STABILITY: FOOD INSECURITY: WITHIN THE PAST 12 MONTHS, THE FOOD YOU BOUGHT JUST DIDN'T LAST AND YOU DIDN'T HAVE MONEY TO GET MORE.: NEVER TRUE

## 2024-11-13 SDOH — ECONOMIC STABILITY: FOOD INSECURITY: WITHIN THE PAST 12 MONTHS, YOU WORRIED THAT YOUR FOOD WOULD RUN OUT BEFORE YOU GOT MONEY TO BUY MORE.: NEVER TRUE

## 2024-11-13 SDOH — ECONOMIC STABILITY: INCOME INSECURITY: HOW HARD IS IT FOR YOU TO PAY FOR THE VERY BASICS LIKE FOOD, HOUSING, MEDICAL CARE, AND HEATING?: NOT HARD AT ALL

## 2024-11-13 ASSESSMENT — PATIENT HEALTH QUESTIONNAIRE - PHQ9
SUM OF ALL RESPONSES TO PHQ QUESTIONS 1-9: 0
SUM OF ALL RESPONSES TO PHQ9 QUESTIONS 1 & 2: 0
2. FEELING DOWN, DEPRESSED OR HOPELESS: NOT AT ALL
SUM OF ALL RESPONSES TO PHQ QUESTIONS 1-9: 0
1. LITTLE INTEREST OR PLEASURE IN DOING THINGS: NOT AT ALL
SUM OF ALL RESPONSES TO PHQ QUESTIONS 1-9: 0
SUM OF ALL RESPONSES TO PHQ QUESTIONS 1-9: 0

## 2024-11-13 ASSESSMENT — ENCOUNTER SYMPTOMS
CONSTIPATION: 1
DIARRHEA: 0
VOMITING: 0
NAUSEA: 0

## 2024-11-13 NOTE — PROGRESS NOTES
Frank Macias is a 49 y.o. female who was seen in clinic today (11/13/2024).    Assessment & Plan:   Below is the assessment and plan developed based on review of pertinent history, physical exam, labs, studies, and medications.    1. Left flank pain  Comments:  xr demonstrates moderate constipation.  Orders:  -     XR ABDOMEN (KUB) (SINGLE AP VIEW); Future  -     Lipase  2. Type 2 diabetes mellitus without complication, without long-term current use of insulin (HCC)  Comments:  will check labs.   can reduce metformin to 2 tablets a day if a1c well controlled.  Orders:  -     CBC  -     Comprehensive Metabolic Panel  -     Hemoglobin A1C  3. Drug-induced constipation  Comments:  secondary to trulicity.  will have her use daily miralax x7 days with increase in water.  if pain persists or she experiences red flag symptoms she will f/u.      No follow-ups on file.    Subjective:   Frank was seen today for Hip Pain (Left sided hip pain)     Patient reported that she has been having left sided flank pain x1 week that will radiate to abdomen.  Patient reported that she has been using heating pad and ice.    Patient reported she will get pain in lower back as well.  Patient denies urinary symptoms or hematuria.    She denies nausea or vomiting.   Has constipation for which was worse last week.  Has started metmucil since last week.     Review of Systems   Constitutional:  Negative for fatigue and fever.   Gastrointestinal:  Positive for constipation. Negative for diarrhea, nausea and vomiting.   Genitourinary:  Positive for flank pain. Negative for dysuria and hematuria.          Objective:     Vitals:    11/13/24 1120   BP: (!) 142/82   Site: Left Upper Arm   Position: Sitting   Pulse: 78   Resp: 16   Temp: 98.4 °F (36.9 °C)   TempSrc: Oral   SpO2: 99%   Weight: 112 kg (247 lb)   Height: 1.829 m (6')      Body mass index is 33.5 kg/m².     Physical Exam  Constitutional:       Appearance: Normal appearance. She is

## 2024-11-13 NOTE — PROGRESS NOTES
Chief Complaint   Patient presents with    Hip Pain     Left sided hip pain     BP (!) 142/82 (Site: Left Upper Arm, Position: Sitting)   Pulse 78   Temp 98.4 °F (36.9 °C) (Oral)   Resp 16   Ht 1.829 m (6')   Wt 112 kg (247 lb)   SpO2 99%   BMI 33.50 kg/m²   \"Have you been to the ER, urgent care clinic since your last visit?  Hospitalized since your last visit?\"    NO    “Have you seen or consulted any other health care providers outside our system since your last visit?”    NO    Have you had a mammogram?”   NO    No breast cancer screening on file      “Have you had a pap smear?”    NO    No cervical cancer screening on file       “Have you had a colorectal cancer screening such as a colonoscopy/FIT/Cologuard?    NO    No colonoscopy on file  No cologuard on file  No FIT/FOBT on file   No flexible sigmoidoscopy on file     “Have you had a diabetic eye exam?”    NO     No diabetic eye exam on file

## 2024-11-14 LAB
ALBUMIN SERPL-MCNC: 4.7 G/DL (ref 3.9–4.9)
ALP SERPL-CCNC: 104 IU/L (ref 44–121)
ALT SERPL-CCNC: 14 IU/L (ref 0–32)
AST SERPL-CCNC: 13 IU/L (ref 0–40)
BILIRUB SERPL-MCNC: 0.7 MG/DL (ref 0–1.2)
BUN SERPL-MCNC: 8 MG/DL (ref 6–24)
BUN/CREAT SERPL: 11 (ref 9–23)
CALCIUM SERPL-MCNC: 10 MG/DL (ref 8.7–10.2)
CHLORIDE SERPL-SCNC: 102 MMOL/L (ref 96–106)
CO2 SERPL-SCNC: 21 MMOL/L (ref 20–29)
CREAT SERPL-MCNC: 0.74 MG/DL (ref 0.57–1)
EGFRCR SERPLBLD CKD-EPI 2021: 100 ML/MIN/1.73
ERYTHROCYTE [DISTWIDTH] IN BLOOD BY AUTOMATED COUNT: 11.7 % (ref 11.7–15.4)
GLOBULIN SER CALC-MCNC: 2.7 G/DL (ref 1.5–4.5)
GLUCOSE SERPL-MCNC: 107 MG/DL (ref 70–99)
HBA1C MFR BLD: 5.9 % (ref 4.8–5.6)
HCT VFR BLD AUTO: 41.4 % (ref 34–46.6)
HGB BLD-MCNC: 13.9 G/DL (ref 11.1–15.9)
LIPASE SERPL-CCNC: 39 U/L (ref 14–72)
MCH RBC QN AUTO: 30.8 PG (ref 26.6–33)
MCHC RBC AUTO-ENTMCNC: 33.6 G/DL (ref 31.5–35.7)
MCV RBC AUTO: 92 FL (ref 79–97)
PLATELET # BLD AUTO: 329 X10E3/UL (ref 150–450)
POTASSIUM SERPL-SCNC: 4.8 MMOL/L (ref 3.5–5.2)
PROT SERPL-MCNC: 7.4 G/DL (ref 6–8.5)
RBC # BLD AUTO: 4.52 X10E6/UL (ref 3.77–5.28)
SODIUM SERPL-SCNC: 142 MMOL/L (ref 134–144)
WBC # BLD AUTO: 6.9 X10E3/UL (ref 3.4–10.8)

## 2024-11-19 ENCOUNTER — PATIENT MESSAGE (OUTPATIENT)
Dept: PRIMARY CARE CLINIC | Facility: CLINIC | Age: 49
End: 2024-11-19

## 2024-11-20 RX ORDER — DULAGLUTIDE 3 MG/.5ML
3 INJECTION, SOLUTION SUBCUTANEOUS WEEKLY
Qty: 4 ADJUSTABLE DOSE PRE-FILLED PEN SYRINGE | Refills: 2 | Status: SHIPPED | OUTPATIENT
Start: 2024-11-20

## 2024-11-25 ENCOUNTER — HOSPITAL ENCOUNTER (OUTPATIENT)
Facility: HOSPITAL | Age: 49
Setting detail: RECURRING SERIES
Discharge: HOME | End: 2024-11-28
Payer: COMMERCIAL

## 2024-11-25 PROCEDURE — 97140 MANUAL THERAPY 1/> REGIONS: CPT

## 2024-11-25 PROCEDURE — 97110 THERAPEUTIC EXERCISES: CPT

## 2024-11-25 NOTE — THERAPY EVALUATION
; Examination:  LOW Complexity : 1-2 Standardized tests and measures addressing body structure, function, activity limitation and / or participation in recreation  ;Presentation:  LOW Complexity : Stable, uncomplicated  ;Clinical Decision Making:  MEDIUM Complexity : FOTO score of 26-74 Overall Complexity Rating: LOW   Problem List: pain affecting function, decrease ROM, decrease strength, decrease ADL/functional abilities, decrease activity tolerance, decrease flexibility/joint mobility, and decrease transfer abilities    Treatment Plan may include any combination of the followin Therapeutic Exercise and 38320 Manual Therapy  Patient / Family readiness to learn indicated by: asking questions  Persons(s) to be included in education: patient (P)  Barriers to Learning/Limitations: none  Measures taken if barriers to learning present: NA  Patient Self Reported Health Status: good  Rehabilitation Potential: good    Short Term Goals: To be accomplished in 12 treatments.  PROM RIGHT SHOULDER = LEFT  Long Term Goals: To be accomplished in 24 treatments.  REACH/LIFT/CARRY/CHORES/ADLs SLEEP WNL    Frequency / Duration: Patient to be seen 2 times per week for 24 treatments.    Patient/ Caregiver education and instruction: Diagnosis, prognosis, exercises   [x]  Plan of care has been reviewed with PTA      Certification Period: 24 TO 25       Vern Dewey, PT       2024       9:49 AM        ===================================================================  I certify that the above Therapy Services are being furnished while the patient is under my care. I agree with the treatment plan and certify that this therapy is necessary.    Physician's Signature:_________________________   DATE:_________   TIME:________                           Fantasma Rodriguez APRN -*    ** Signature, Date and Time must be completed for valid certification **  Please sign and fax to [phone].  Thank you

## 2024-12-03 ENCOUNTER — HOSPITAL ENCOUNTER (OUTPATIENT)
Facility: HOSPITAL | Age: 49
Setting detail: RECURRING SERIES
Discharge: HOME | End: 2024-12-06
Payer: COMMERCIAL

## 2024-12-03 PROCEDURE — 97140 MANUAL THERAPY 1/> REGIONS: CPT

## 2024-12-03 PROCEDURE — 97110 THERAPEUTIC EXERCISES: CPT

## 2024-12-03 NOTE — PROGRESS NOTES
PHYSICAL THERAPY - MEDICARE DAILY TREATMENT NOTE (updated 3/23)      Date: 12/3/2024          Patient Name:  Frank Macias :  1975   Medical   Diagnosis:  Chronic right shoulder pain [M25.511, G89.29] Treatment Diagnosis:  M25.511  RIGHT SHOULDER PAIN    Referral Source:  Fantasma Rodriguez APRN -* Insurance:   Payor: [facility] / Plan: [facility] [place] / Product Type: *No Product type* /                     Patient  verified yes     Visit #   Current  / Total 2 24   Time   In / Out 910 [id number]   Total Treatment Time 45   Total Timed Codes 10   1:1 Treatment Time 30       BC Totals Reminder:  bill using total billable   min of TIMED therapeutic procedures and modalities.   8-22 min = 1 unit; 23-37 min = 2 units; 38-52 min = 3 units; 53-67 min = 4 units; 68-82 min = 5 units            SUBJECTIVE    Pain Level (0-10 scale): 7    Any medication changes, allergies to medications, adverse drug reactions, diagnosis change, or new procedure performed?: [x] No    [] Yes (see summary sheet for update)  Medications: Verified on Patient Summary List    Subjective functional status/changes:     About the same    OBJECTIVE      Therapeutic Procedures:  Tx Min Billable or 1:1 Min (if diff from Tx Min) Procedure, Rationale, Specifics   30  25087 Therapeutic Exercise (timed):  increase ROM, strength, coordination, balance, and proprioception to improve patient's ability to progress to PLOF and address remaining functional goals. (see flow sheet as applicable)     Details if applicable:     10  35364 Manual Therapy (timed):  decrease pain and increase ROM to improve patient's ability to progress to PLOF and address remaining functional goals.  The manual therapy interventions were performed at a separate and distinct time from the therapeutic activities interventions . (see flow sheet as applicable)     Details if applicable:           Details if applicable:           Details if applicable:            Details if

## 2024-12-05 ENCOUNTER — HOSPITAL ENCOUNTER (OUTPATIENT)
Facility: HOSPITAL | Age: 49
Setting detail: RECURRING SERIES
Discharge: HOME | End: 2024-12-08
Payer: COMMERCIAL

## 2024-12-05 DIAGNOSIS — M25.511 ACUTE PAIN OF BOTH SHOULDERS: ICD-10-CM

## 2024-12-05 DIAGNOSIS — M25.512 ACUTE PAIN OF BOTH SHOULDERS: ICD-10-CM

## 2024-12-05 PROCEDURE — 97110 THERAPEUTIC EXERCISES: CPT

## 2024-12-05 PROCEDURE — 97140 MANUAL THERAPY 1/> REGIONS: CPT

## 2024-12-05 NOTE — PROGRESS NOTES
PHYSICAL THERAPY - MEDICARE DAILY TREATMENT NOTE (updated 3/23)      Date: 2024          Patient Name:  Frank Macias :  1975   Medical   Diagnosis:  Chronic right shoulder pain [M25.511, G89.29] Treatment Diagnosis:  M25.511  RIGHT SHOULDER PAIN    Referral Source:  Fantasma Rodriguez APRN -* Insurance:   Payor: [facility] / Plan: [facility] / Product Type: *No Product type* /                     Patient  verified yes     Visit #   Current  / Total 3 24   Time   In / Out 900 945   Total Treatment Time 45   Total Timed Codes 45   1:1 Treatment Time 45      [facility] Totals Reminder:  bill using total billable   min of TIMED therapeutic procedures and modalities.   8-22 min = 1 unit; 23-37 min = 2 units; 38-52 min = 3 units; 53-67 min = 4 units; 68-82 min = 5 units            SUBJECTIVE    Pain Level (0-10 scale): 4    Any medication changes, allergies to medications, adverse drug reactions, diagnosis change, or new procedure performed?: [x] No    [] Yes (see summary sheet for update)  Medications: Verified on Patient Summary List    Subjective functional status/changes: Pt reports  feeling better since starting PT, although now R hand is hurting today    OBJECTIVE      Therapeutic Procedures:  Tx Min Billable or 1:1 Min (if diff from Tx Min) Procedure, Rationale, Specifics   30  13434 Therapeutic Exercise (timed):  increase ROM, strength, coordination, balance, and proprioception to improve patient's ability to progress to PLOF and address remaining functional goals. (see flow sheet as applicable)     Details if applicable:     15  35046 Manual Therapy (timed):  decrease pain and increase ROM to improve patient's ability to progress to PLOF and address remaining functional goals.  The manual therapy interventions were performed at a separate and distinct time from the therapeutic activities interventions . (see flow sheet as applicable)     Details if applicable:           Details if applicable:

## 2024-12-10 ENCOUNTER — HOSPITAL ENCOUNTER (OUTPATIENT)
Facility: HOSPITAL | Age: 49
Setting detail: RECURRING SERIES
Discharge: HOME | End: 2024-12-13
Payer: COMMERCIAL

## 2024-12-10 PROCEDURE — 97140 MANUAL THERAPY 1/> REGIONS: CPT

## 2024-12-10 PROCEDURE — 97110 THERAPEUTIC EXERCISES: CPT

## 2024-12-10 NOTE — PROGRESS NOTES
Details if applicable:            Details if applicable:     44     Total Total        min []  Other:      Skin assessment post-treatment (if applicable):    [x]  intact    []  redness- no adverse reaction                 []redness - adverse reaction:          [x]  Patient Education billed concurrently with other procedures   [x] Review HEP    [] Progressed/Changed HEP, detail:    [] Other detail:         Other Objective/Functional Measures  Rom per flow sheet    Pain Level at end of session (0-10 scale): 4      Assessment   Pt demonstrated improved tolerance for TE during today's session with no adverse reactions. Patient will continue to benefit from skilled PT / OT services to modify and progress therapeutic interventions to address functional deficits and attain remaining goals.    Progress toward goals / Updated goals:  []  See Progress Note/Recertification    Short Term Goals: To be accomplished in 12 treatments.  PROM RIGHT SHOULDER = LEFT  Long Term Goals: To be accomplished in 24 treatments.  REACH/LIFT/CARRY/CHORES/ADLs SLEEP WNL      PLAN  Yes  Continue plan of care  Re-Cert Due: 2/23/25  []  Upgrade activities as tolerated  []  Discharge due to:  []  Other:      AMY ESTRADA, ANA       12/10/2024       12:13 PM

## 2024-12-12 ENCOUNTER — HOSPITAL ENCOUNTER (OUTPATIENT)
Facility: HOSPITAL | Age: 49
Setting detail: RECURRING SERIES
Discharge: HOME | End: 2024-12-15
Payer: COMMERCIAL

## 2024-12-12 PROCEDURE — 97110 THERAPEUTIC EXERCISES: CPT

## 2024-12-12 PROCEDURE — 97140 MANUAL THERAPY 1/> REGIONS: CPT

## 2024-12-12 NOTE — PROGRESS NOTES
PHYSICAL THERAPY - MEDICARE DAILY TREATMENT NOTE (updated 3/23)      Date: 2024          Patient Name:  Frank Macias :  1975   Medical   Diagnosis:  Chronic right shoulder pain [M25.511, G89.29] Treatment Diagnosis:  M25.511  RIGHT SHOULDER PAIN    Referral Source:  Fantasma Rodriguez APRN -* Insurance:   Payor: [facility] / Plan: [facility] / Product Type: *No Product type* /                     Patient  verified yes     Visit #   Current  / Total 5 24   Time   In / Out 900 955   Total Treatment Time 55   Total Timed Codes 48   1:1 Treatment Time 38      [facility] Totals Reminder:  bill using total billable   min of TIMED therapeutic procedures and modalities.   8-22 min = 1 unit; 23-37 min = 2 units; 38-52 min = 3 units; 53-67 min = 4 units; 68-82 min = 5 units            SUBJECTIVE    Pain Level (0-10 scale): 5    Any medication changes, allergies to medications, adverse drug reactions, diagnosis change, or new procedure performed?: [x] No    [] Yes (see summary sheet for update)  Medications: Verified on Patient Summary List    Subjective functional status/changes:   Pt reports increased shd p! Last night with reduced sleep. She expresses R hand is still better    OBJECTIVE      Therapeutic Procedures:  Tx Min Billable or 1:1 Min (if diff from Tx Min) Procedure, Rationale, Specifics   23  03065 Therapeutic Exercise (timed):  increase ROM, strength, coordination, balance, and proprioception to improve patient's ability to progress to PLOF and address remaining functional goals. (see flow sheet as applicable)     Details if applicable:     15  60219 Manual Therapy (timed):  decrease pain and increase ROM to improve patient's ability to progress to PLOF and address remaining functional goals.  The manual therapy interventions were performed at a separate and distinct time from the therapeutic activities interventions . (see flow sheet as applicable)     Details if applicable:  STM to R Shd

## 2024-12-17 ENCOUNTER — PATIENT MESSAGE (OUTPATIENT)
Dept: PRIMARY CARE CLINIC | Facility: CLINIC | Age: 49
End: 2024-12-17

## 2024-12-17 ENCOUNTER — HOSPITAL ENCOUNTER (OUTPATIENT)
Facility: HOSPITAL | Age: 49
Setting detail: RECURRING SERIES
Discharge: HOME | End: 2024-12-20
Payer: COMMERCIAL

## 2024-12-17 ENCOUNTER — APPOINTMENT (OUTPATIENT)
Facility: HOSPITAL | Age: 49
End: 2024-12-17
Payer: COMMERCIAL

## 2024-12-17 PROCEDURE — 97110 THERAPEUTIC EXERCISES: CPT

## 2024-12-17 PROCEDURE — 97140 MANUAL THERAPY 1/> REGIONS: CPT

## 2024-12-17 NOTE — PROGRESS NOTES
PHYSICAL THERAPY - MEDICARE DAILY TREATMENT NOTE (updated 3/23)      Date: 2024          Patient Name:  Frank Macias :  1975   Medical   Diagnosis:  Chronic right shoulder pain [M25.511, G89.29] Treatment Diagnosis:  M25.511  RIGHT SHOULDER PAIN    Referral Source:  Fantasma Rodriguez APRN -* Insurance:   Payor: [facility] / Plan: [facility] / Product Type: *No Product type* /                     Patient  verified yes     Visit #   Current  / Total 6 24   Time   In / Out 150 230   Total Treatment Time 40   Total Timed Codes 40   1:1 Treatment Time 40      [facility] Totals Reminder:  bill using total billable   min of TIMED therapeutic procedures and modalities.   8-22 min = 1 unit; 23-37 min = 2 units; 38-52 min = 3 units; 53-67 min = 4 units; 68-82 min = 5 units            SUBJECTIVE    Pain Level (0-10 scale): 7    Any medication changes, allergies to medications, adverse drug reactions, diagnosis change, or new procedure performed?: [x] No    [] Yes (see summary sheet for update)  Medications: Verified on Patient Summary List    Subjective functional status/changes:   Pt reports increased shd p! Last night she says she slept well uninterrupted however woke up with more stiffness. She expresses R hand is still better    OBJECTIVE      Therapeutic Procedures:  Tx Min Billable or 1:1 Min (if diff from Tx Min) Procedure, Rationale, Specifics   27  03181 Therapeutic Exercise (timed):  increase ROM, strength, coordination, balance, and proprioception to improve patient's ability to progress to PLOF and address remaining functional goals. (see flow sheet as applicable)     Details if applicable:     13  36936 Manual Therapy (timed):  decrease pain and increase ROM to improve patient's ability to progress to PLOF and address remaining functional goals.  The manual therapy interventions were performed at a separate and distinct time from the therapeutic activities interventions . (see flow sheet as

## 2024-12-18 ENCOUNTER — HOSPITAL ENCOUNTER (OUTPATIENT)
Facility: HOSPITAL | Age: 49
Setting detail: RECURRING SERIES
Discharge: HOME | End: 2024-12-21
Payer: COMMERCIAL

## 2024-12-18 PROCEDURE — 97110 THERAPEUTIC EXERCISES: CPT

## 2024-12-18 PROCEDURE — 97140 MANUAL THERAPY 1/> REGIONS: CPT

## 2024-12-18 NOTE — PROGRESS NOTES
[facility]  [address], [address]  [place] [address]  Phone: [phone]    Fax: [phone]    PHYSICAL THERAPY PROGRESS NOTE  Patient Name:  Frank Macias :  1975   Treatment/Medical Diagnosis: Chronic right shoulder pain [M25.511, G89.29]   Referral Source:  Fantasma Rodriguez APRN -*     Date of Initial Visit:  24 Attended Visits:  7 Missed Visits:  0     SUMMARY OF TREATMENT/ASSESSMENT:  Mrs. Padgett is a pleasant woman that displays improved ROM from eval and a positive attitude toward achieving her goals to return to PLOF. She has been compliant with her HEP and has tolerated progressions to her POC with each visit with minimal p! Today's measure of R shoulder flexion was met with soft end range p! and muscle tremble. Her ER/IR TE demonstrates reduced tolerance indicating ongoing weakness. Mrs. 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     CURRENT STATUS/GOALS      Short Term Goals: To be accomplished in 12 treatments.  PROM RIGHT SHOULDER = LEFT Progressing 24  Long Term Goals: To be accomplished in 24 treatments.  REACH/LIFT/CARRY/CHORES/ADLs SLEEP WNL Progressing 24      RECOMMENDATIONS FOR SKILLED THERAPY  Patient will continue to benefit from skilled PT / OT services to modify and progress therapeutic interventions to address functional deficits and attain remaining goals.         AMY ESTRADA, PTA       2024       4:11 PM    If you have any questions/comments please contact us directly at [phone].   Thank you for allowing us to assist in the care of your patient.

## 2024-12-18 NOTE — PROGRESS NOTES
PHYSICAL THERAPY - MEDICARE DAILY TREATMENT NOTE (updated 3/23)      Date: 2024          Patient Name:  Frank Macias :  1975   Medical   Diagnosis:  Chronic right shoulder pain [M25.511, G89.29] Treatment Diagnosis:  M25.511  RIGHT SHOULDER PAIN    Referral Source:  Fantasma Rodriguez APRN -* Insurance:   Payor: [facility] / Plan: [facility] / Product Type: *No Product type* /                     Patient  verified yes     Visit #   Current  / Total 7 24   Time   In / Out 155 240   Total Treatment Time 45   Total Timed Codes 45   1:1 Treatment Time 45      [facility] Totals Reminder:  bill using total billable   min of TIMED therapeutic procedures and modalities.   8-22 min = 1 unit; 23-37 min = 2 units; 38-52 min = 3 units; 53-67 min = 4 units; 68-82 min = 5 units            SUBJECTIVE    Pain Level (0-10 scale): 5    Any medication changes, allergies to medications, adverse drug reactions, diagnosis change, or new procedure performed?: [x] No    [] Yes (see summary sheet for update)  Medications: Verified on Patient Summary List    Subjective functional status/changes:   Pt reports improved neck pain since buying a new pillow. She states R hand is still better    OBJECTIVE      Therapeutic Procedures:  Tx Min Billable or 1:1 Min (if diff from Tx Min) Procedure, Rationale, Specifics   30  25924 Therapeutic Exercise (timed):  increase ROM, strength, coordination, balance, and proprioception to improve patient's ability to progress to PLOF and address remaining functional goals. (see flow sheet as applicable)     Details if applicable:     15  86105 Manual Therapy (timed):  decrease pain and increase ROM to improve patient's ability to progress to PLOF and address remaining functional goals.  The manual therapy interventions were performed at a separate and distinct time from the therapeutic activities interventions . (see flow sheet as applicable)     Details if applicable:  STM to R Shd/deltoid

## 2024-12-19 ENCOUNTER — APPOINTMENT (OUTPATIENT)
Facility: HOSPITAL | Age: 49
End: 2024-12-19
Payer: COMMERCIAL

## 2024-12-19 ENCOUNTER — PATIENT MESSAGE (OUTPATIENT)
Dept: PRIMARY CARE CLINIC | Facility: CLINIC | Age: 49
End: 2024-12-19

## 2024-12-24 ENCOUNTER — APPOINTMENT (OUTPATIENT)
Facility: HOSPITAL | Age: 49
End: 2024-12-24
Payer: COMMERCIAL

## 2024-12-26 ENCOUNTER — APPOINTMENT (OUTPATIENT)
Facility: HOSPITAL | Age: 49
End: 2024-12-26
Payer: COMMERCIAL

## 2024-12-31 ENCOUNTER — HOSPITAL ENCOUNTER (OUTPATIENT)
Facility: HOSPITAL | Age: 49
Setting detail: RECURRING SERIES
Discharge: HOME | End: 2025-01-03
Payer: COMMERCIAL

## 2024-12-31 PROCEDURE — 97110 THERAPEUTIC EXERCISES: CPT

## 2024-12-31 PROCEDURE — 97140 MANUAL THERAPY 1/> REGIONS: CPT

## 2024-12-31 NOTE — PROGRESS NOTES
Shd/deltoid PROM         Details if applicable:           Details if applicable:            Details if applicable:     45     Total Total        min []  Other:MHP to R shd    seated   Skin assessment post-treatment (if applicable):    [x]  intact    []  redness- no adverse reaction                 []redness - adverse reaction:          [x]  Patient Education billed concurrently with other procedures   [x] Review HEP    [] Progressed/Changed HEP, detail:    [] Other detail:         Other Objective/Functional Measures  R Shd Flexion: 115 p!  Rom per flow sheet    Pain Level at end of session (0-10 scale): 4      Assessment   Pt demonstrates positive carryover with stretches and AAROM to facilitate further ABD and Flexion for R shoulder. Pt displays muscle tremble at end range for each TE.  Her ER/IR TE demonstrates reduced tolerance indicating ongoing weakness. Patient will continue to benefit from skilled PT / OT services to modify and progress therapeutic interventions to address functional deficits and attain remaining goals.    Progress toward goals / Updated goals:  []  See Progress Note/Recertification    Short Term Goals: To be accomplished in 12 treatments.  PROM RIGHT SHOULDER = LEFT Progressing 12/18/24  Long Term Goals: To be accomplished in 24 treatments.  REACH/LIFT/CARRY/CHORES/ADLs SLEEP WNL      PLAN  Yes  Continue plan of care  Re-Cert Due: 2/23/25  []  Upgrade activities as tolerated  []  Discharge due to:  []  Other:      AYM ESTRADA, ANA       12/31/2024       11:32 AM

## 2025-01-02 ENCOUNTER — HOSPITAL ENCOUNTER (OUTPATIENT)
Facility: HOSPITAL | Age: 50
Setting detail: RECURRING SERIES
Discharge: HOME | End: 2025-01-05
Payer: COMMERCIAL

## 2025-01-02 PROCEDURE — 97140 MANUAL THERAPY 1/> REGIONS: CPT | Performed by: PHYSICAL THERAPIST

## 2025-01-02 PROCEDURE — 97110 THERAPEUTIC EXERCISES: CPT | Performed by: PHYSICAL THERAPIST

## 2025-01-02 NOTE — PROGRESS NOTES
PHYSICAL THERAPY - MEDICARE DAILY TREATMENT NOTE (updated 3/23)      Date: 2025          Patient Name:  Frank Macias :  1975   Medical   Diagnosis:  Chronic right shoulder pain [M25.511, G89.29] Treatment Diagnosis:  M25.511  RIGHT SHOULDER PAIN    Referral Source:  Fantasma Rodriguez APRN -* Insurance:   [place] [employer] / Product Type: *No Product type* /                     Patient  verified yes     Visit #   Current  / Total 9 24   Time   In / Out 9:50 am 10:00 am   Total Treatment Time 50   Total Timed Codes 40   1:1 Treatment Time 40       [facility] Totals Reminder:  bill using total billable   min of TIMED therapeutic procedures and modalities.   8-22 min = 1 unit; 23-37 min = 2 units; 38-52 min = 3 units; 53-67 min = 4 units; 68-82 min = 5 units            SUBJECTIVE    Pain Level (0-10 scale): 7    Any medication changes, allergies to medications, adverse drug reactions, diagnosis change, or new procedure performed?: [x] No    [] Yes (see summary sheet for update)  Medications: Verified on Patient Summary List    Subjective functional status/changes:     I am really sore today.  Maybe I slept in a weird position.       OBJECTIVE      Therapeutic Procedures:  Tx Min Billable or 1:1 Min (if diff from Tx Min) Procedure, Rationale, Specifics   30  76681 Therapeutic Exercise (timed):  increase ROM, strength, coordination, balance, and proprioception to improve patient's ability to progress to PLOF and address remaining functional goals. (see flow sheet as applicable)     Details if applicable:     15  50758 Manual Therapy (timed):  decrease pain and increase ROM to improve patient's ability to progress to PLOF and address remaining functional goals.  The manual therapy interventions were performed at a separate and distinct time from the therapeutic activities interventions . (see flow sheet as applicable)     Details if applicable:  STM to R Shd/deltoid PROM         Details if

## 2025-01-07 ENCOUNTER — APPOINTMENT (OUTPATIENT)
Facility: HOSPITAL | Age: 50
End: 2025-01-07
Payer: COMMERCIAL

## 2025-01-09 ENCOUNTER — HOSPITAL ENCOUNTER (OUTPATIENT)
Facility: HOSPITAL | Age: 50
Setting detail: RECURRING SERIES
Discharge: HOME | End: 2025-01-12
Payer: COMMERCIAL

## 2025-01-09 PROCEDURE — 97110 THERAPEUTIC EXERCISES: CPT

## 2025-01-09 NOTE — PROGRESS NOTES
PHYSICAL THERAPY - MEDICARE DAILY TREATMENT NOTE (updated 3/23)      Date: 2025          Patient Name:  Frank Macias :  1975   Medical   Diagnosis:  Chronic right shoulder pain [M25.511, G89.29] Treatment Diagnosis:  M25.511  RIGHT SHOULDER PAIN    Referral Source:  Fantasma Rodriguez APRN -* [place]:   [place] [employer] [employer] / Product Type: *No Product type* /                     Patient  verified yes     Visit #   Current  / Total 10 24   Time   In / Out 11:45 am 12:28 pm   Total Treatment Time 43   Total Timed Codes 38   1:1 Treatment Time 43       [facility] Totals Reminder:  bill using total billable   min of TIMED therapeutic procedures and modalities.   8-22 min = 1 unit; 23-37 min = 2 units; 38-52 min = 3 units; 53-67 min = 4 units; 68-82 min = 5 units            SUBJECTIVE    Pain Level (0-10 scale): 2    Any medication changes, allergies to medications, adverse drug reactions, diagnosis change, or new procedure performed?: [x] No    [] Yes (see summary sheet for update)  Medications: Verified on Patient Summary List    Subjective functional status/changes:   Pt reports she is still having pain with sleeping.    OBJECTIVE      Therapeutic Procedures:  Tx Min Billable or 1:1 Min (if diff from Tx Min) Procedure, Rationale, Specifics   38  13047 Therapeutic Exercise (timed):  increase ROM, strength, coordination, balance, and proprioception to improve patient's ability to progress to PLOF and address remaining functional goals. (see flow sheet as applicable)     Details if applicable:       90700 Manual Therapy (timed):  decrease pain and increase ROM to improve patient's ability to progress to PLOF and address remaining functional goals.  The manual therapy interventions were performed at a separate and distinct time from the therapeutic activities interventions . (see flow sheet as applicable)     Details if applicable:  STM to R Shd/deltoid PROM         Details if applicable:

## 2025-01-10 SDOH — ECONOMIC STABILITY: FOOD INSECURITY: WITHIN THE PAST 12 MONTHS, YOU WORRIED THAT YOUR FOOD WOULD RUN OUT BEFORE YOU GOT MONEY TO BUY MORE.: NEVER TRUE

## 2025-01-10 SDOH — ECONOMIC STABILITY: INCOME INSECURITY: IN THE LAST 12 MONTHS, WAS THERE A TIME WHEN YOU WERE NOT ABLE TO PAY THE MORTGAGE OR RENT ON TIME?: NO

## 2025-01-10 SDOH — ECONOMIC STABILITY: FOOD INSECURITY: WITHIN THE PAST 12 MONTHS, THE FOOD YOU BOUGHT JUST DIDN'T LAST AND YOU DIDN'T HAVE MONEY TO GET MORE.: NEVER TRUE

## 2025-01-10 SDOH — ECONOMIC STABILITY: TRANSPORTATION INSECURITY
IN THE PAST 12 MONTHS, HAS THE LACK OF TRANSPORTATION KEPT YOU FROM MEDICAL APPOINTMENTS OR FROM GETTING MEDICATIONS?: NO

## 2025-01-13 ENCOUNTER — TELEMEDICINE (OUTPATIENT)
Dept: PRIMARY CARE CLINIC | Facility: CLINIC | Age: 50
End: 2025-01-13
Payer: COMMERCIAL

## 2025-01-13 DIAGNOSIS — M25.511 CHRONIC RIGHT SHOULDER PAIN: Primary | ICD-10-CM

## 2025-01-13 DIAGNOSIS — E11.9 TYPE 2 DIABETES MELLITUS WITHOUT COMPLICATION, WITHOUT LONG-TERM CURRENT USE OF INSULIN (HCC): ICD-10-CM

## 2025-01-13 DIAGNOSIS — G89.29 CHRONIC RIGHT SHOULDER PAIN: Primary | ICD-10-CM

## 2025-01-13 PROCEDURE — 99213 OFFICE O/P EST LOW 20 MIN: CPT | Performed by: NURSE PRACTITIONER

## 2025-01-13 ASSESSMENT — PATIENT HEALTH QUESTIONNAIRE - PHQ9
2. FEELING DOWN, DEPRESSED OR HOPELESS: NOT AT ALL
SUM OF ALL RESPONSES TO PHQ9 QUESTIONS 1 & 2: 0
SUM OF ALL RESPONSES TO PHQ QUESTIONS 1-9: 0
1. LITTLE INTEREST OR PLEASURE IN DOING THINGS: NOT AT ALL

## 2025-01-13 NOTE — PROGRESS NOTES
Frank Macias is a 49 y.o. female who was seen via telemedicine today 1/13/2025).    Assessment & Plan:   Below is the assessment and plan developed based on review of pertinent history, physical exam, labs, studies, and medications.    1. Chronic right shoulder pain  Comments:  will refer to ortho since this has been ongoing x 6 months with no relief.  Orders:  -     [facility] - Hadfield, Mark, MD, Orthopedic Surgery (shoulder), [place]  2. Type 2 diabetes mellitus without complication, without long-term current use of insulin ([facility])  Comments:  she would like to reduce trulicity back to 1.5mg  will recheck labs in 1 month  Orders:  -     dulaglutide (TRULICITY) 1.5 MG/0.5ML SC injection; Inject 0.5 mLs into the skin every 7 days, Disp-2 mL, R-0Normal      No follow-ups on file.    Subjective:   Frank was seen today for Pain (Patient has been getting PT and was told to see pcp for a ortho referral)     Patient reports continued right shoulder pain. She has been doing physical therapy since November and feels the PT isn't helping at all. She has been taking naproxen for the pain.  We did an xray in July 2024 which showed osteoarthritis. Patient reports it hurts with movement, hurts to get dressed. It is hard for her to sleep because it is so painful.     Review of Systems   Musculoskeletal:  Positive for arthralgias.          Objective:     There were no vitals filed for this visit.   There is no height or weight on file to calculate BMI.     Physical Exam  Constitutional:       General: She is not in acute distress.     Appearance: Normal appearance.   HENT:      Head: Normocephalic.   Pulmonary:      Effort: Pulmonary effort is normal.   Neurological:      Mental Status: She is alert and oriented to person, place, and time. Mental status is at baseline.   Psychiatric:         Mood and Affect: Mood normal.         Behavior: Behavior normal.          Allergies   Allergen Reactions    Linagliptin Other (See

## 2025-01-13 NOTE — PROGRESS NOTES
\"Have you been to the ER, urgent care clinic since your last visit?  Hospitalized since your last visit?\"    NO    “Have you seen or consulted any other health care providers outside our system since your last visit?”    NO    Have you had a mammogram?”   NO    No breast cancer screening on file      “Have you had a pap smear?”    NO    No cervical cancer screening on file       “Have you had a colorectal cancer screening such as a colonoscopy/FIT/Cologuard?    NO    No colonoscopy on file  No cologuard on file  No FIT/FOBT on file   No flexible sigmoidoscopy on file     “Have you had a diabetic eye exam?”    NO due in march    No diabetic eye exam on file

## 2025-01-14 ENCOUNTER — APPOINTMENT (OUTPATIENT)
Facility: HOSPITAL | Age: 50
End: 2025-01-14
Payer: COMMERCIAL

## 2025-01-16 ENCOUNTER — APPOINTMENT (OUTPATIENT)
Facility: HOSPITAL | Age: 50
End: 2025-01-16
Payer: COMMERCIAL

## 2025-01-31 ENCOUNTER — TELEMEDICINE (OUTPATIENT)
Dept: PRIMARY CARE CLINIC | Facility: CLINIC | Age: 50
End: 2025-01-31
Payer: COMMERCIAL

## 2025-01-31 DIAGNOSIS — G89.29 CHRONIC RIGHT SHOULDER PAIN: Primary | ICD-10-CM

## 2025-01-31 DIAGNOSIS — M25.511 CHRONIC RIGHT SHOULDER PAIN: Primary | ICD-10-CM

## 2025-01-31 PROCEDURE — 99213 OFFICE O/P EST LOW 20 MIN: CPT | Performed by: NURSE PRACTITIONER

## 2025-01-31 RX ORDER — LIDOCAINE 4 G/G
1 PATCH TOPICAL DAILY
Qty: 10 PATCH | Refills: 0 | Status: SHIPPED | OUTPATIENT
Start: 2025-01-31 | End: 2025-03-02

## 2025-01-31 RX ORDER — PREDNISONE 20 MG/1
TABLET ORAL
Qty: 12 TABLET | Refills: 0 | Status: SHIPPED | OUTPATIENT
Start: 2025-01-31 | End: 2025-02-05

## 2025-01-31 NOTE — PROGRESS NOTES
\"Have you been to the ER, urgent care clinic since your last visit?  Hospitalized since your last visit?\"    NO    “Have you seen or consulted any other health care providers outside our system since your last visit?”    NO    Have you had a mammogram?”   NO    No breast cancer screening on file      “Have you had a pap smear?”    NO    No cervical cancer screening on file       “Have you had a colorectal cancer screening such as a colonoscopy/FIT/Cologuard?    NO    No colonoscopy on file  No cologuard on file  No FIT/FOBT on file   No flexible sigmoidoscopy on file     “Have you had a diabetic eye exam?”    NO     No diabetic eye exam on file

## 2025-01-31 NOTE — PROGRESS NOTES
Frank Macias is a 49 y.o. female who was seen via telemedicine today 1/31/2025).    Assessment & Plan:   Below is the assessment and plan developed based on review of pertinent history, physical exam, labs, studies, and medications.    1. Chronic right shoulder pain  Comments:  will do steroid pack for acute flare up. Continue naproxen, can also use tylenol arthritis. rec heat/ice. She has an appt with ortho on 2/6  Orders:  -     predniSONE (DELTASONE) 20 MG tablet; Take 3 tablets by mouth daily for 2 days, THEN 2 tablets daily for 2 days, THEN 1 tablet daily for 2 days., Disp-12 tablet, R-0Normal  -     lidocaine 4 % external patch; Place 1 patch onto the skin daily, TransDERmal, DAILY Starting Fri 1/31/2025, Until Sun 3/2/2025, For 30 days, Disp-10 patch, R-0, Normal      No follow-ups on file.    Subjective:   Frank was seen today for Other (Patients needs new pain meds for shoulder until she sees specialist on next thursday)     Patient reports chronic right shoulder pain. She has had the pain for about 7 months. She reports limited ROM of arm, cannot lift arm over the head. She is currently taking naproxen for the pain. Thinks she re injured it this morning putting a shirt on  She reports neck pain.  She completed PT, did not help much.  She has an appointment with ortho on Feb 6    Xray July 2024 showed osteoarthritis.     Review of Systems   Musculoskeletal:  Positive for arthralgias.          Objective:     There were no vitals filed for this visit.   There is no height or weight on file to calculate BMI.     Physical Exam  Constitutional:       General: She is not in acute distress.     Appearance: Normal appearance.   HENT:      Head: Normocephalic.   Pulmonary:      Effort: Pulmonary effort is normal.   Neurological:      Mental Status: She is alert and oriented to person, place, and time. Mental status is at baseline.   Psychiatric:         Mood and Affect: Mood normal.         Behavior:

## 2025-02-08 ENCOUNTER — PATIENT MESSAGE (OUTPATIENT)
Dept: PRIMARY CARE CLINIC | Facility: CLINIC | Age: 50
End: 2025-02-08

## 2025-02-08 DIAGNOSIS — E11.9 TYPE 2 DIABETES MELLITUS WITHOUT COMPLICATION, WITHOUT LONG-TERM CURRENT USE OF INSULIN (HCC): ICD-10-CM

## 2025-02-12 ENCOUNTER — OFFICE VISIT (OUTPATIENT)
Dept: PRIMARY CARE CLINIC | Facility: CLINIC | Age: 50
End: 2025-02-12
Payer: COMMERCIAL

## 2025-02-12 ENCOUNTER — PATIENT MESSAGE (OUTPATIENT)
Dept: PRIMARY CARE CLINIC | Facility: CLINIC | Age: 50
End: 2025-02-12

## 2025-02-12 VITALS
HEIGHT: 72 IN | WEIGHT: 248.6 LBS | DIASTOLIC BLOOD PRESSURE: 82 MMHG | SYSTOLIC BLOOD PRESSURE: 129 MMHG | HEART RATE: 71 BPM | BODY MASS INDEX: 33.67 KG/M2 | TEMPERATURE: 97.5 F

## 2025-02-12 DIAGNOSIS — I10 ESSENTIAL (PRIMARY) HYPERTENSION: ICD-10-CM

## 2025-02-12 DIAGNOSIS — R35.0 URINARY FREQUENCY: Primary | ICD-10-CM

## 2025-02-12 DIAGNOSIS — E11.9 TYPE 2 DIABETES MELLITUS WITHOUT COMPLICATION, WITHOUT LONG-TERM CURRENT USE OF INSULIN (HCC): ICD-10-CM

## 2025-02-12 LAB
BILIRUBIN, URINE, POC: NEGATIVE
BLOOD URINE, POC: ABNORMAL
GLUCOSE URINE, POC: 500
KETONES, URINE, POC: NEGATIVE
LEUKOCYTE ESTERASE, URINE, POC: ABNORMAL
NITRITE, URINE, POC: NEGATIVE
PH, URINE, POC: 6 (ref 4.6–8)
PROTEIN,URINE, POC: NEGATIVE
SPECIFIC GRAVITY, URINE, POC: 1.01 (ref 1–1.03)
URINALYSIS CLARITY, POC: ABNORMAL
URINALYSIS COLOR, POC: YELLOW
UROBILINOGEN, POC: ABNORMAL MG/DL

## 2025-02-12 PROCEDURE — 3074F SYST BP LT 130 MM HG: CPT | Performed by: NURSE PRACTITIONER

## 2025-02-12 PROCEDURE — 81001 URINALYSIS AUTO W/SCOPE: CPT | Performed by: NURSE PRACTITIONER

## 2025-02-12 PROCEDURE — 3079F DIAST BP 80-89 MM HG: CPT | Performed by: NURSE PRACTITIONER

## 2025-02-12 PROCEDURE — 99214 OFFICE O/P EST MOD 30 MIN: CPT | Performed by: NURSE PRACTITIONER

## 2025-02-12 ASSESSMENT — ENCOUNTER SYMPTOMS: SHORTNESS OF BREATH: 0

## 2025-02-12 NOTE — PROGRESS NOTES
\"Have you been to the ER, urgent care clinic since your last visit?  Hospitalized since your last visit?\"    NO    “Have you seen or consulted any other health care providers outside our system since your last visit?”    NO    Have you had a mammogram?”   NO    No breast cancer screening on file      “Have you had a pap smear?”    NO    No cervical cancer screening on file       “Have you had a colorectal cancer screening such as a colonoscopy/FIT/Cologuard?    NO    No colonoscopy on file  No cologuard on file  No FIT/FOBT on file   No flexible sigmoidoscopy on file     “Have you had a diabetic eye exam?”    NO     No diabetic eye exam on file          
person, place, and time. Mental status is at baseline.   Psychiatric:         Mood and Affect: Mood normal.         Behavior: Behavior normal.               Denisa Reed, NANO - CNP

## 2025-02-13 LAB
ALBUMIN SERPL-MCNC: 4.6 G/DL (ref 3.9–4.9)
ALP SERPL-CCNC: 92 IU/L (ref 44–121)
ALT SERPL-CCNC: 12 IU/L (ref 0–32)
AST SERPL-CCNC: 14 IU/L (ref 0–40)
BILIRUB SERPL-MCNC: 0.7 MG/DL (ref 0–1.2)
BUN SERPL-MCNC: 11 MG/DL (ref 6–24)
BUN/CREAT SERPL: 15 (ref 9–23)
CALCIUM SERPL-MCNC: 10 MG/DL (ref 8.7–10.2)
CHLORIDE SERPL-SCNC: 101 MMOL/L (ref 96–106)
CHOLEST SERPL-MCNC: 118 MG/DL (ref 100–199)
CO2 SERPL-SCNC: 23 MMOL/L (ref 20–29)
CREAT SERPL-MCNC: 0.73 MG/DL (ref 0.57–1)
EGFRCR SERPLBLD CKD-EPI 2021: 102 ML/MIN/1.73
ERYTHROCYTE [DISTWIDTH] IN BLOOD BY AUTOMATED COUNT: 11.4 % (ref 11.7–15.4)
GLOBULIN SER CALC-MCNC: 2.7 G/DL (ref 1.5–4.5)
GLUCOSE SERPL-MCNC: 94 MG/DL (ref 70–99)
HBA1C MFR BLD: 5.8 % (ref 4.8–5.6)
HCT VFR BLD AUTO: 41 % (ref 34–46.6)
HDLC SERPL-MCNC: 39 MG/DL
HGB BLD-MCNC: 13.6 G/DL (ref 11.1–15.9)
LDLC SERPL CALC-MCNC: 66 MG/DL (ref 0–99)
MCH RBC QN AUTO: 30.4 PG (ref 26.6–33)
MCHC RBC AUTO-ENTMCNC: 33.2 G/DL (ref 31.5–35.7)
MCV RBC AUTO: 92 FL (ref 79–97)
PLATELET # BLD AUTO: 335 X10E3/UL (ref 150–450)
POTASSIUM SERPL-SCNC: 4.5 MMOL/L (ref 3.5–5.2)
PROT SERPL-MCNC: 7.3 G/DL (ref 6–8.5)
RBC # BLD AUTO: 4.47 X10E6/UL (ref 3.77–5.28)
SODIUM SERPL-SCNC: 139 MMOL/L (ref 134–144)
TRIGL SERPL-MCNC: 57 MG/DL (ref 0–149)
VLDLC SERPL CALC-MCNC: 13 MG/DL (ref 5–40)
WBC # BLD AUTO: 7.6 X10E3/UL (ref 3.4–10.8)

## 2025-02-13 RX ORDER — PHENAZOPYRIDINE HYDROCHLORIDE 100 MG/1
100 TABLET, FILM COATED ORAL 3 TIMES DAILY PRN
Qty: 12 TABLET | Refills: 0 | Status: SHIPPED | OUTPATIENT
Start: 2025-02-13 | End: 2026-02-13

## 2025-02-15 LAB — BACTERIA UR CULT: ABNORMAL

## 2025-02-16 RX ORDER — CIPROFLOXACIN 500 MG/1
500 TABLET, FILM COATED ORAL 2 TIMES DAILY
Qty: 14 TABLET | Refills: 0 | Status: SHIPPED | OUTPATIENT
Start: 2025-02-16 | End: 2025-02-23

## 2025-04-06 DIAGNOSIS — I10 ESSENTIAL (PRIMARY) HYPERTENSION: ICD-10-CM

## 2025-04-07 RX ORDER — NEBIVOLOL 20 MG/1
1 TABLET ORAL DAILY
Qty: 90 TABLET | Refills: 1 | Status: SHIPPED | OUTPATIENT
Start: 2025-04-07

## 2025-04-09 DIAGNOSIS — E11.9 TYPE 2 DIABETES MELLITUS WITHOUT COMPLICATION, WITHOUT LONG-TERM CURRENT USE OF INSULIN: ICD-10-CM

## 2025-04-28 DIAGNOSIS — E11.9 TYPE 2 DIABETES MELLITUS WITHOUT COMPLICATION, WITHOUT LONG-TERM CURRENT USE OF INSULIN (HCC): ICD-10-CM

## 2025-04-29 RX ORDER — DAPAGLIFLOZIN 10 MG/1
10 TABLET, FILM COATED ORAL DAILY
Qty: 90 TABLET | Refills: 1 | Status: SHIPPED | OUTPATIENT
Start: 2025-04-29

## 2025-05-06 DIAGNOSIS — E11.9 TYPE 2 DIABETES MELLITUS WITHOUT COMPLICATION, WITHOUT LONG-TERM CURRENT USE OF INSULIN (HCC): ICD-10-CM

## 2025-05-07 DIAGNOSIS — E11.9 TYPE 2 DIABETES MELLITUS WITHOUT COMPLICATION, WITHOUT LONG-TERM CURRENT USE OF INSULIN (HCC): ICD-10-CM

## 2025-05-08 ENCOUNTER — PATIENT MESSAGE (OUTPATIENT)
Dept: PRIMARY CARE CLINIC | Facility: CLINIC | Age: 50
End: 2025-05-08

## 2025-05-08 DIAGNOSIS — E11.9 TYPE 2 DIABETES MELLITUS WITHOUT COMPLICATION, WITHOUT LONG-TERM CURRENT USE OF INSULIN (HCC): ICD-10-CM

## 2025-05-08 RX ORDER — MELOXICAM 7.5 MG/1
7.5 TABLET ORAL DAILY
Qty: 90 TABLET | Refills: 1 | Status: SHIPPED | OUTPATIENT
Start: 2025-05-08

## 2025-05-08 RX ORDER — DULAGLUTIDE 3 MG/.5ML
INJECTION, SOLUTION SUBCUTANEOUS
Qty: 2 ML | Refills: 0 | Status: SHIPPED | OUTPATIENT
Start: 2025-05-08 | End: 2025-05-08 | Stop reason: SDUPTHER

## 2025-05-08 RX ORDER — DULAGLUTIDE 3 MG/.5ML
3 INJECTION, SOLUTION SUBCUTANEOUS WEEKLY
Qty: 2 ML | Refills: 0 | Status: SHIPPED | OUTPATIENT
Start: 2025-05-08

## 2025-05-08 RX ORDER — METFORMIN HYDROCHLORIDE 500 MG/1
2000 TABLET, EXTENDED RELEASE ORAL
Qty: 360 TABLET | Refills: 1 | Status: SHIPPED | OUTPATIENT
Start: 2025-05-08

## 2025-05-16 ENCOUNTER — PATIENT MESSAGE (OUTPATIENT)
Dept: PRIMARY CARE CLINIC | Facility: CLINIC | Age: 50
End: 2025-05-16

## 2025-05-16 DIAGNOSIS — E11.9 TYPE 2 DIABETES MELLITUS WITHOUT COMPLICATION, WITHOUT LONG-TERM CURRENT USE OF INSULIN (HCC): Primary | ICD-10-CM

## 2025-05-22 ENCOUNTER — RESULTS FOLLOW-UP (OUTPATIENT)
Dept: PRIMARY CARE CLINIC | Facility: CLINIC | Age: 50
End: 2025-05-22

## 2025-05-22 LAB
ALBUMIN SERPL-MCNC: 4.6 G/DL (ref 3.9–4.9)
ALP SERPL-CCNC: 99 IU/L (ref 44–121)
ALT SERPL-CCNC: 17 IU/L (ref 0–32)
AST SERPL-CCNC: 15 IU/L (ref 0–40)
BILIRUB SERPL-MCNC: 0.6 MG/DL (ref 0–1.2)
BUN SERPL-MCNC: 16 MG/DL (ref 6–24)
BUN/CREAT SERPL: 22 (ref 9–23)
CALCIUM SERPL-MCNC: 9.8 MG/DL (ref 8.7–10.2)
CHLORIDE SERPL-SCNC: 102 MMOL/L (ref 96–106)
CHOLEST SERPL-MCNC: 126 MG/DL (ref 100–199)
CO2 SERPL-SCNC: 23 MMOL/L (ref 20–29)
CREAT SERPL-MCNC: 0.73 MG/DL (ref 0.57–1)
EGFRCR SERPLBLD CKD-EPI 2021: 102 ML/MIN/1.73
ERYTHROCYTE [DISTWIDTH] IN BLOOD BY AUTOMATED COUNT: 11.7 % (ref 11.7–15.4)
GLOBULIN SER CALC-MCNC: 2.5 G/DL (ref 1.5–4.5)
GLUCOSE SERPL-MCNC: 108 MG/DL (ref 70–99)
HBA1C MFR BLD: 5.8 % (ref 4.8–5.6)
HCT VFR BLD AUTO: 41.9 % (ref 34–46.6)
HDLC SERPL-MCNC: 44 MG/DL
HGB BLD-MCNC: 13.7 G/DL (ref 11.1–15.9)
LDLC SERPL CALC-MCNC: 68 MG/DL (ref 0–99)
MCH RBC QN AUTO: 31.6 PG (ref 26.6–33)
MCHC RBC AUTO-ENTMCNC: 32.7 G/DL (ref 31.5–35.7)
MCV RBC AUTO: 97 FL (ref 79–97)
PLATELET # BLD AUTO: 293 X10E3/UL (ref 150–450)
POTASSIUM SERPL-SCNC: 4.5 MMOL/L (ref 3.5–5.2)
PROT SERPL-MCNC: 7.1 G/DL (ref 6–8.5)
RBC # BLD AUTO: 4.34 X10E6/UL (ref 3.77–5.28)
SODIUM SERPL-SCNC: 139 MMOL/L (ref 134–144)
TRIGL SERPL-MCNC: 68 MG/DL (ref 0–149)
VLDLC SERPL CALC-MCNC: 14 MG/DL (ref 5–40)
WBC # BLD AUTO: 7.2 X10E3/UL (ref 3.4–10.8)

## 2025-07-07 DIAGNOSIS — E11.9 TYPE 2 DIABETES MELLITUS WITHOUT COMPLICATION, WITHOUT LONG-TERM CURRENT USE OF INSULIN (HCC): ICD-10-CM

## 2025-07-07 RX ORDER — DULAGLUTIDE 3 MG/.5ML
3 INJECTION, SOLUTION SUBCUTANEOUS WEEKLY
Qty: 2 ML | Refills: 0 | Status: SHIPPED | OUTPATIENT
Start: 2025-07-07

## 2025-07-08 DIAGNOSIS — E78.2 MIXED HYPERLIPIDEMIA: ICD-10-CM

## 2025-07-08 RX ORDER — ROSUVASTATIN CALCIUM 10 MG/1
10 TABLET, COATED ORAL NIGHTLY
Qty: 90 TABLET | Refills: 1 | Status: SHIPPED | OUTPATIENT
Start: 2025-07-08

## 2025-08-01 ENCOUNTER — PATIENT MESSAGE (OUTPATIENT)
Dept: PRIMARY CARE CLINIC | Facility: CLINIC | Age: 50
End: 2025-08-01

## 2025-08-01 DIAGNOSIS — E11.9 TYPE 2 DIABETES MELLITUS WITHOUT COMPLICATION, WITHOUT LONG-TERM CURRENT USE OF INSULIN (HCC): ICD-10-CM

## 2025-08-01 RX ORDER — DULAGLUTIDE 3 MG/.5ML
3 INJECTION, SOLUTION SUBCUTANEOUS WEEKLY
Qty: 2 ML | Refills: 0 | Status: SHIPPED | OUTPATIENT
Start: 2025-08-01 | End: 2025-08-03 | Stop reason: SDUPTHER

## 2025-08-03 RX ORDER — DULAGLUTIDE 3 MG/.5ML
3 INJECTION, SOLUTION SUBCUTANEOUS WEEKLY
Qty: 2 ML | Refills: 0 | Status: SHIPPED | OUTPATIENT
Start: 2025-08-03

## 2025-08-31 DIAGNOSIS — E11.9 TYPE 2 DIABETES MELLITUS WITHOUT COMPLICATION, WITHOUT LONG-TERM CURRENT USE OF INSULIN (HCC): ICD-10-CM

## 2025-09-02 RX ORDER — DULAGLUTIDE 3 MG/.5ML
3 INJECTION, SOLUTION SUBCUTANEOUS WEEKLY
Qty: 2 ML | Refills: 0 | Status: SHIPPED | OUTPATIENT
Start: 2025-09-02